# Patient Record
Sex: MALE | Race: WHITE | NOT HISPANIC OR LATINO | Employment: UNEMPLOYED | URBAN - NONMETROPOLITAN AREA
[De-identification: names, ages, dates, MRNs, and addresses within clinical notes are randomized per-mention and may not be internally consistent; named-entity substitution may affect disease eponyms.]

---

## 2017-01-13 VITALS
RESPIRATION RATE: 18 BRPM | DIASTOLIC BLOOD PRESSURE: 83 MMHG | BODY MASS INDEX: 27.2 KG/M2 | TEMPERATURE: 97.2 F | SYSTOLIC BLOOD PRESSURE: 125 MMHG | WEIGHT: 190 LBS | OXYGEN SATURATION: 95 % | HEIGHT: 70 IN | HEART RATE: 122 BPM

## 2017-01-14 ENCOUNTER — HOSPITAL ENCOUNTER (EMERGENCY)
Facility: HOSPITAL | Age: 57
Discharge: LEFT BEFORE DISCHARGE | End: 2017-01-14

## 2017-01-18 ENCOUNTER — HOSPITAL ENCOUNTER (INPATIENT)
Facility: HOSPITAL | Age: 57
LOS: 4 days | Discharge: HOME | End: 2017-01-22
Attending: EMERGENCY MEDICINE | Admitting: FAMILY MEDICINE

## 2017-01-18 ENCOUNTER — APPOINTMENT (OUTPATIENT)
Dept: GENERAL RADIOLOGY | Facility: HOSPITAL | Age: 57
End: 2017-01-18

## 2017-01-18 DIAGNOSIS — L03.116 CELLULITIS OF LEFT LOWER EXTREMITY: Primary | ICD-10-CM

## 2017-01-18 LAB
ALBUMIN SERPL-MCNC: 3.8 G/DL (ref 3.5–5)
ALBUMIN/GLOB SERPL: 0.9 G/DL (ref 1.5–2.5)
ALP SERPL-CCNC: 248 U/L (ref 46–116)
ALT SERPL W P-5'-P-CCNC: 24 U/L (ref 10–44)
ANION GAP SERPL CALCULATED.3IONS-SCNC: 10.2 MMOL/L (ref 3.6–11.2)
AST SERPL-CCNC: 37 U/L (ref 10–34)
BASOPHILS # BLD AUTO: 0.01 10*3/MM3 (ref 0–0.3)
BASOPHILS NFR BLD AUTO: 0.2 % (ref 0–2)
BILIRUB SERPL-MCNC: 0.3 MG/DL (ref 0.2–1.8)
BILIRUB UR QL STRIP: NEGATIVE
BUN BLD-MCNC: 6 MG/DL (ref 7–21)
BUN/CREAT SERPL: 8.5 (ref 7–25)
CALCIUM SPEC-SCNC: 8.7 MG/DL (ref 7.7–10)
CHLORIDE SERPL-SCNC: 102 MMOL/L (ref 99–112)
CLARITY UR: CLEAR
CO2 SERPL-SCNC: 32.8 MMOL/L (ref 24.3–31.9)
COLOR UR: YELLOW
CREAT BLD-MCNC: 0.71 MG/DL (ref 0.43–1.29)
CRP SERPL-MCNC: 0.91 MG/DL (ref 0–0.99)
D-LACTATE SERPL-SCNC: 2.7 MMOL/L (ref 0.5–2)
D-LACTATE SERPL-SCNC: 3 MMOL/L (ref 0.5–2)
DEPRECATED RDW RBC AUTO: 56.6 FL (ref 37–54)
EOSINOPHIL # BLD AUTO: 0.07 10*3/MM3 (ref 0–0.7)
EOSINOPHIL NFR BLD AUTO: 1.5 % (ref 0–5)
ERYTHROCYTE [DISTWIDTH] IN BLOOD BY AUTOMATED COUNT: 18 % (ref 11.5–14.5)
ERYTHROCYTE [SEDIMENTATION RATE] IN BLOOD: 29 MM/HR (ref 0–20)
GFR SERPL CREATININE-BSD FRML MDRD: 114 ML/MIN/1.73
GLOBULIN UR ELPH-MCNC: 4.4 GM/DL
GLUCOSE BLD-MCNC: 353 MG/DL (ref 70–110)
GLUCOSE BLDC GLUCOMTR-MCNC: 206 MG/DL (ref 70–130)
GLUCOSE BLDC GLUCOMTR-MCNC: 296 MG/DL (ref 70–130)
GLUCOSE UR STRIP-MCNC: ABNORMAL MG/DL
HCT VFR BLD AUTO: 41.7 % (ref 42–52)
HGB BLD-MCNC: 12.8 G/DL (ref 14–18)
HGB UR QL STRIP.AUTO: NEGATIVE
HOLD SPECIMEN: NORMAL
HOLD SPECIMEN: NORMAL
IMM GRANULOCYTES # BLD: 0 10*3/MM3 (ref 0–0.03)
IMM GRANULOCYTES NFR BLD: 0 % (ref 0–0.5)
KETONES UR QL STRIP: NEGATIVE
LEUKOCYTE ESTERASE UR QL STRIP.AUTO: NEGATIVE
LYMPHOCYTES # BLD AUTO: 1.63 10*3/MM3 (ref 1–3)
LYMPHOCYTES NFR BLD AUTO: 35.1 % (ref 21–51)
MCH RBC QN AUTO: 27.4 PG (ref 27–33)
MCHC RBC AUTO-ENTMCNC: 30.7 G/DL (ref 33–37)
MCV RBC AUTO: 89.1 FL (ref 80–94)
MONOCYTES # BLD AUTO: 0.42 10*3/MM3 (ref 0.1–0.9)
MONOCYTES NFR BLD AUTO: 9 % (ref 0–10)
NEUTROPHILS # BLD AUTO: 2.52 10*3/MM3 (ref 1.4–6.5)
NEUTROPHILS NFR BLD AUTO: 54.2 % (ref 30–70)
NITRITE UR QL STRIP: NEGATIVE
OSMOLALITY SERPL CALC.SUM OF ELEC: 300.5 MOSM/KG (ref 273–305)
PH UR STRIP.AUTO: 7 [PH] (ref 5–8)
PLATELET # BLD AUTO: 193 10*3/MM3 (ref 130–400)
PMV BLD AUTO: 9.8 FL (ref 6–10)
POTASSIUM BLD-SCNC: 3.5 MMOL/L (ref 3.5–5.3)
PROT SERPL-MCNC: 8.2 G/DL (ref 6–8)
PROT UR QL STRIP: NEGATIVE
RBC # BLD AUTO: 4.68 10*6/MM3 (ref 4.7–6.1)
SODIUM BLD-SCNC: 145 MMOL/L (ref 135–153)
SP GR UR STRIP: 1.02 (ref 1–1.03)
URATE SERPL-MCNC: 4 MG/DL (ref 3.7–7)
UROBILINOGEN UR QL STRIP: ABNORMAL
WBC NRBC COR # BLD: 4.65 10*3/MM3 (ref 4.5–12.5)
WHOLE BLOOD HOLD SPECIMEN: NORMAL
WHOLE BLOOD HOLD SPECIMEN: NORMAL

## 2017-01-18 PROCEDURE — 25010000002 ONDANSETRON PER 1 MG

## 2017-01-18 PROCEDURE — 85652 RBC SED RATE AUTOMATED: CPT | Performed by: PHYSICIAN ASSISTANT

## 2017-01-18 PROCEDURE — 86140 C-REACTIVE PROTEIN: CPT | Performed by: PHYSICIAN ASSISTANT

## 2017-01-18 PROCEDURE — 25010000002 MORPHINE SULFATE (PF) 2 MG/ML SOLUTION: Performed by: FAMILY MEDICINE

## 2017-01-18 PROCEDURE — 73610 X-RAY EXAM OF ANKLE: CPT | Performed by: RADIOLOGY

## 2017-01-18 PROCEDURE — 83605 ASSAY OF LACTIC ACID: CPT | Performed by: EMERGENCY MEDICINE

## 2017-01-18 PROCEDURE — 73630 X-RAY EXAM OF FOOT: CPT

## 2017-01-18 PROCEDURE — 25010000002 PIPERACILLIN-TAZOBACTAM: Performed by: PHYSICIAN ASSISTANT

## 2017-01-18 PROCEDURE — 96361 HYDRATE IV INFUSION ADD-ON: CPT

## 2017-01-18 PROCEDURE — 25010000002 VANCOMYCIN PER 500 MG

## 2017-01-18 PROCEDURE — 36415 COLL VENOUS BLD VENIPUNCTURE: CPT

## 2017-01-18 PROCEDURE — 84550 ASSAY OF BLOOD/URIC ACID: CPT | Performed by: PHYSICIAN ASSISTANT

## 2017-01-18 PROCEDURE — G0378 HOSPITAL OBSERVATION PER HR: HCPCS

## 2017-01-18 PROCEDURE — 25010000002 MORPHINE PER 10 MG: Performed by: EMERGENCY MEDICINE

## 2017-01-18 PROCEDURE — 85025 COMPLETE CBC W/AUTO DIFF WBC: CPT | Performed by: PHYSICIAN ASSISTANT

## 2017-01-18 PROCEDURE — 63710000001 INSULIN ASPART PER 5 UNITS: Performed by: FAMILY MEDICINE

## 2017-01-18 PROCEDURE — 81003 URINALYSIS AUTO W/O SCOPE: CPT | Performed by: PHYSICIAN ASSISTANT

## 2017-01-18 PROCEDURE — 25010000002 PIPERACILLIN-TAZOBACTAM: Performed by: FAMILY MEDICINE

## 2017-01-18 PROCEDURE — 82962 GLUCOSE BLOOD TEST: CPT

## 2017-01-18 PROCEDURE — 25010000002 VANCOMYCIN PER 500 MG: Performed by: PHYSICIAN ASSISTANT

## 2017-01-18 PROCEDURE — 80053 COMPREHEN METABOLIC PANEL: CPT | Performed by: PHYSICIAN ASSISTANT

## 2017-01-18 PROCEDURE — 25010000002 HYDROMORPHONE PER 4 MG

## 2017-01-18 PROCEDURE — 96375 TX/PRO/DX INJ NEW DRUG ADDON: CPT

## 2017-01-18 PROCEDURE — 73610 X-RAY EXAM OF ANKLE: CPT

## 2017-01-18 PROCEDURE — 99284 EMERGENCY DEPT VISIT MOD MDM: CPT

## 2017-01-18 PROCEDURE — 73630 X-RAY EXAM OF FOOT: CPT | Performed by: RADIOLOGY

## 2017-01-18 PROCEDURE — 96374 THER/PROPH/DIAG INJ IV PUSH: CPT

## 2017-01-18 PROCEDURE — 87040 BLOOD CULTURE FOR BACTERIA: CPT | Performed by: EMERGENCY MEDICINE

## 2017-01-18 RX ORDER — ONDANSETRON 2 MG/ML
INJECTION INTRAMUSCULAR; INTRAVENOUS
Status: COMPLETED
Start: 2017-01-18 | End: 2017-01-18

## 2017-01-18 RX ORDER — MORPHINE SULFATE 2 MG/ML
2 INJECTION, SOLUTION INTRAMUSCULAR; INTRAVENOUS
Status: DISCONTINUED | OUTPATIENT
Start: 2017-01-18 | End: 2017-01-22 | Stop reason: HOSPADM

## 2017-01-18 RX ORDER — TRAMADOL HYDROCHLORIDE 50 MG/1
50 TABLET ORAL 3 TIMES DAILY PRN
Status: CANCELLED | OUTPATIENT
Start: 2017-01-18

## 2017-01-18 RX ORDER — CITALOPRAM 20 MG/1
20 TABLET ORAL DAILY
Status: CANCELLED | OUTPATIENT
Start: 2017-01-19

## 2017-01-18 RX ORDER — POTASSIUM CHLORIDE 750 MG/1
10 CAPSULE, EXTENDED RELEASE ORAL DAILY
Status: CANCELLED | OUTPATIENT
Start: 2017-01-19

## 2017-01-18 RX ORDER — SPIRONOLACTONE 25 MG/1
25 TABLET ORAL DAILY
Status: CANCELLED | OUTPATIENT
Start: 2017-01-19

## 2017-01-18 RX ORDER — TRAMADOL HYDROCHLORIDE 50 MG/1
50 TABLET ORAL 3 TIMES DAILY PRN
COMMUNITY

## 2017-01-18 RX ORDER — SODIUM CHLORIDE 9 MG/ML
INJECTION, SOLUTION INTRAVENOUS
Status: COMPLETED
Start: 2017-01-18 | End: 2017-01-18

## 2017-01-18 RX ORDER — ONDANSETRON 2 MG/ML
4 INJECTION INTRAMUSCULAR; INTRAVENOUS ONCE
Status: COMPLETED | OUTPATIENT
Start: 2017-01-18 | End: 2017-01-18

## 2017-01-18 RX ORDER — NICOTINE POLACRILEX 4 MG
15 LOZENGE BUCCAL
Status: DISCONTINUED | OUTPATIENT
Start: 2017-01-18 | End: 2017-01-22 | Stop reason: HOSPADM

## 2017-01-18 RX ORDER — ALPRAZOLAM 1 MG/1
1 TABLET ORAL 2 TIMES DAILY PRN
Status: DISCONTINUED | OUTPATIENT
Start: 2017-01-18 | End: 2017-01-22 | Stop reason: HOSPADM

## 2017-01-18 RX ORDER — PANTOPRAZOLE SODIUM 40 MG/1
40 TABLET, DELAYED RELEASE ORAL 2 TIMES DAILY
Status: CANCELLED | OUTPATIENT
Start: 2017-01-18

## 2017-01-18 RX ORDER — DULOXETIN HYDROCHLORIDE 60 MG/1
60 CAPSULE, DELAYED RELEASE ORAL DAILY
Status: CANCELLED | OUTPATIENT
Start: 2017-01-19

## 2017-01-18 RX ORDER — GLIPIZIDE 5 MG/1
5 TABLET ORAL
Status: DISCONTINUED | OUTPATIENT
Start: 2017-01-18 | End: 2017-01-22 | Stop reason: HOSPADM

## 2017-01-18 RX ORDER — PANTOPRAZOLE SODIUM 40 MG/1
40 TABLET, DELAYED RELEASE ORAL
Status: DISCONTINUED | OUTPATIENT
Start: 2017-01-18 | End: 2017-01-22 | Stop reason: HOSPADM

## 2017-01-18 RX ORDER — POTASSIUM CHLORIDE 750 MG/1
10 TABLET, FILM COATED, EXTENDED RELEASE ORAL DAILY
Status: DISCONTINUED | OUTPATIENT
Start: 2017-01-19 | End: 2017-01-22 | Stop reason: HOSPADM

## 2017-01-18 RX ORDER — ALPRAZOLAM 1 MG/1
1 TABLET ORAL 2 TIMES DAILY PRN
COMMUNITY

## 2017-01-18 RX ORDER — DEXTROSE MONOHYDRATE 25 G/50ML
25 INJECTION, SOLUTION INTRAVENOUS
Status: DISCONTINUED | OUTPATIENT
Start: 2017-01-18 | End: 2017-01-22 | Stop reason: HOSPADM

## 2017-01-18 RX ORDER — GABAPENTIN 400 MG/1
800 CAPSULE ORAL EVERY 6 HOURS SCHEDULED
Status: DISCONTINUED | OUTPATIENT
Start: 2017-01-18 | End: 2017-01-22 | Stop reason: HOSPADM

## 2017-01-18 RX ORDER — SPIRONOLACTONE 25 MG/1
25 TABLET ORAL DAILY
Status: DISCONTINUED | OUTPATIENT
Start: 2017-01-19 | End: 2017-01-22 | Stop reason: HOSPADM

## 2017-01-18 RX ORDER — DULOXETIN HYDROCHLORIDE 60 MG/1
60 CAPSULE, DELAYED RELEASE ORAL DAILY
Status: DISCONTINUED | OUTPATIENT
Start: 2017-01-19 | End: 2017-01-22 | Stop reason: HOSPADM

## 2017-01-18 RX ORDER — ALPRAZOLAM 0.5 MG/1
1 TABLET ORAL 2 TIMES DAILY PRN
Status: CANCELLED | OUTPATIENT
Start: 2017-01-18

## 2017-01-18 RX ORDER — GABAPENTIN 400 MG/1
800 CAPSULE ORAL 4 TIMES DAILY PRN
Status: CANCELLED | OUTPATIENT
Start: 2017-01-18

## 2017-01-18 RX ORDER — POTASSIUM CHLORIDE 750 MG/1
10 CAPSULE, EXTENDED RELEASE ORAL DAILY
Status: DISCONTINUED | OUTPATIENT
Start: 2017-01-19 | End: 2017-01-18 | Stop reason: SDUPTHER

## 2017-01-18 RX ORDER — GLIPIZIDE 5 MG/1
5 TABLET ORAL
Status: CANCELLED | OUTPATIENT
Start: 2017-01-18

## 2017-01-18 RX ORDER — CITALOPRAM 20 MG/1
20 TABLET ORAL DAILY
Status: DISCONTINUED | OUTPATIENT
Start: 2017-01-19 | End: 2017-01-22 | Stop reason: HOSPADM

## 2017-01-18 RX ADMIN — SODIUM CHLORIDE 1000 ML: 9 INJECTION, SOLUTION INTRAVENOUS at 09:50

## 2017-01-18 RX ADMIN — HYDROMORPHONE HYDROCHLORIDE 1 MG: 1 INJECTION, SOLUTION INTRAMUSCULAR; INTRAVENOUS; SUBCUTANEOUS at 11:21

## 2017-01-18 RX ADMIN — ONDANSETRON 4 MG: 2 INJECTION INTRAMUSCULAR; INTRAVENOUS at 10:40

## 2017-01-18 RX ADMIN — Medication 1 MG: at 11:21

## 2017-01-18 RX ADMIN — GLIPIZIDE 5 MG: 5 TABLET ORAL at 18:23

## 2017-01-18 RX ADMIN — INSULIN ASPART 4 UNITS: 100 INJECTION, SOLUTION INTRAVENOUS; SUBCUTANEOUS at 20:14

## 2017-01-18 RX ADMIN — SODIUM CHLORIDE 1500 ML: 9 INJECTION, SOLUTION INTRAVENOUS at 10:39

## 2017-01-18 RX ADMIN — VANCOMYCIN HYDROCHLORIDE 1500 MG: 5 INJECTION, POWDER, LYOPHILIZED, FOR SOLUTION INTRAVENOUS at 20:06

## 2017-01-18 RX ADMIN — GABAPENTIN 800 MG: 400 CAPSULE ORAL at 18:23

## 2017-01-18 RX ADMIN — MORPHINE SULFATE 2 MG: 2 INJECTION, SOLUTION INTRAMUSCULAR; INTRAVENOUS at 20:14

## 2017-01-18 RX ADMIN — ONDANSETRON 4 MG: 2 INJECTION, SOLUTION INTRAMUSCULAR; INTRAVENOUS at 10:40

## 2017-01-18 RX ADMIN — PIPERACILLIN SODIUM,TAZOBACTAM SODIUM 3.38 G: 3; .375 INJECTION, POWDER, FOR SOLUTION INTRAVENOUS at 23:18

## 2017-01-18 RX ADMIN — MORPHINE SULFATE 2 MG: 2 INJECTION, SOLUTION INTRAMUSCULAR; INTRAVENOUS at 17:22

## 2017-01-18 RX ADMIN — PIPERACILLIN SODIUM,TAZOBACTAM SODIUM 3.38 G: 3; .375 INJECTION, POWDER, FOR SOLUTION INTRAVENOUS at 17:22

## 2017-01-18 RX ADMIN — MORPHINE SULFATE 4 MG: 4 INJECTION, SOLUTION INTRAMUSCULAR; INTRAVENOUS at 10:42

## 2017-01-18 RX ADMIN — MORPHINE SULFATE 2 MG: 2 INJECTION, SOLUTION INTRAMUSCULAR; INTRAVENOUS at 23:04

## 2017-01-18 RX ADMIN — PANTOPRAZOLE SODIUM 40 MG: 40 TABLET, DELAYED RELEASE ORAL at 18:23

## 2017-01-18 RX ADMIN — GABAPENTIN 800 MG: 400 CAPSULE ORAL at 23:18

## 2017-01-18 RX ADMIN — INSULIN ASPART 4 UNITS: 100 INJECTION, SOLUTION INTRAVENOUS; SUBCUTANEOUS at 17:22

## 2017-01-18 NOTE — CONSULTS
Pharmacy was consulted for vancomycin dosing for cellulitis LLE.  Based on pt parameters will initiate vancomycin at 1500mg iv q12hrs to target trough levels of 15-20 mg/L.  Pharmacy will continue to follow and obtain trough level once steady state is achieved.  Thank you.

## 2017-01-18 NOTE — ED NOTES
Pt resting quietly on stretcher with complaint of pain.  Pt AAOx4 with no resp distress noted.  Pt denies any needs at this time.  Skin PWD.  Pt family at bedside. Will continue to monitor and follow plan of care.     Malorie Friedman RN  01/18/17 1000

## 2017-01-18 NOTE — ED NOTES
Pt reports that he has been having pain in his left foot x 1 week.  Reports that he noticed a bump to the area, sterilized a razor blade and cut it open.  Redness, tenderness, erythema, warmth noted to area.  Pt reports that he is having severe pain to his left foot and feels like it is in his bone.  Pt family at bedside.     Malorie Friedman RN  01/18/17 3462

## 2017-01-18 NOTE — ED NOTES
Pt resting quietly on stretcher with complaint of pain.  Pt AAOx4 with no resp distress noted.  Pt denies any needs at this time.  Skin PWD.  Pt family at bedside. Will continue to monitor and follow plan of care.     Malorie Friedman RN  01/18/17 8009

## 2017-01-18 NOTE — ED NOTES
Pt resting quietly on stretcher with continuing complaint of pain.  Pt AAOx4 with no resp distress noted.  Pt denies any needs at this time.  Skin PWD.  Pt family at bedside. Will continue to monitor and follow plan of care.     Malorie Friedman RN  01/18/17 9981

## 2017-01-18 NOTE — ED PROVIDER NOTES
Subjective   Patient is a 56 y.o. male presenting with lower extremity pain.   History provided by:  Patient   used: No    Lower Extremity Issue   Location:  Ankle and foot  Time since incident:  1 week  Injury: no    Ankle location:  L ankle  Foot location:  L foot  Pain details:     Severity:  Moderate    Duration:  2 weeks    Timing:  Constant    Progression:  Worsening  Chronicity:  New  Dislocation: no    Foreign body present:  No foreign bodies  Prior injury to area:  No  Worsened by:  Nothing  Ineffective treatments:  None tried  Associated symptoms: stiffness and swelling    Associated symptoms: no back pain and no decreased ROM    Risk factors: no concern for non-accidental trauma, no frequent fractures and no obesity        Review of Systems   Musculoskeletal: Positive for stiffness. Negative for back pain.   Skin: Positive for rash and wound.   All other systems reviewed and are negative.      Past Medical History   Diagnosis Date   • Alcohol abuse    • Anxiety    • Depression    • Diabetes    • GERD (gastroesophageal reflux disease)    • Hepatic encephalopathy    • Hypertension    • Liver disease    • Neuropathic pain    • Withdrawal symptoms, alcohol        Allergies   Allergen Reactions   • Sulfa Antibiotics        Past Surgical History   Procedure Laterality Date   • Knee surgery Right        Family History   Problem Relation Age of Onset   • Family history unknown: Yes       Social History     Social History   • Marital status:      Spouse name: N/A   • Number of children: N/A   • Years of education: N/A     Social History Main Topics   • Smoking status: Current Every Day Smoker     Packs/day: 1.00     Years: 40.00     Types: Cigarettes   • Smokeless tobacco: Never Used      Comment: declines   • Alcohol use Yes      Comment: patient says he started drinking at age 11. drank heavy for several years starting about 45 years ago. has been drinking more which is now  up to a   5th a day this time for about 7 months.    • Drug use: No      Comment: denies   • Sexual activity: Yes     Partners: Female     Birth control/ protection: None     Other Topics Concern   • None     Social History Narrative           Objective   Physical Exam   Constitutional: He is oriented to person, place, and time. He appears well-developed and well-nourished.   HENT:   Head: Normocephalic.   Right Ear: External ear normal.   Left Ear: External ear normal.   Nose: Nose normal.   Mouth/Throat: Oropharynx is clear and moist.   Eyes: Conjunctivae and EOM are normal. Pupils are equal, round, and reactive to light.   Neck: Normal range of motion. Neck supple. No thyromegaly present.   Cardiovascular: Normal rate, regular rhythm, normal heart sounds and intact distal pulses.    Pulmonary/Chest: Effort normal and breath sounds normal.   Abdominal: Soft. Bowel sounds are normal.   Lymphadenopathy:     He has no cervical adenopathy.   Neurological: He is alert and oriented to person, place, and time. He has normal reflexes.   Skin: Skin is warm and dry. Bruising, ecchymosis and rash noted.        Small blisters to left lateral malleolus.  Approximately 2 cm scaling plaque.  Moderate erythematous rash extending from left foot approximately to mid calf.   Psychiatric: He has a normal mood and affect. His behavior is normal. Judgment and thought content normal.   Nursing note and vitals reviewed.      Procedures         ED Course  ED Course   Comment By Time   This is a 56-year-old male comes in with chief complaint left lower extremity pain, swelling, redness.  Patient does report that approximately one week ago he noticed some small blisters on the lower part of his leg that he tried to pop.  He states that approximately 2 days after this he developed apparent secondary infection.  Patient denies any fever, chills.  Patient is a known diabetic. Berry Lambert PA-C 01/18 1759   Discuss care with Dr. Sanon regarding  patient's left lower extremity cellulitis.  Agrees that patient should be admitted with IV antibiotics.  Berry Lambert PA-C 01/18 1236                  MDM  Number of Diagnoses or Management Options  Cellulitis of left lower extremity: new and requires workup     Amount and/or Complexity of Data Reviewed  Clinical lab tests: ordered and reviewed    Risk of Complications, Morbidity, and/or Mortality  Presenting problems: moderate  Diagnostic procedures: moderate  Management options: moderate    Patient Progress  Patient progress: stable      Final diagnoses:   Cellulitis of left lower extremity            Berry Lambert PA-C  01/18/17 1241

## 2017-01-18 NOTE — PLAN OF CARE
Problem: Patient Care Overview (Adult)  Goal: Plan of Care Review  Outcome: Ongoing (interventions implemented as appropriate)    Problem: Fall Risk (Adult)  Goal: Identify Related Risk Factors and Signs and Symptoms  Outcome: Ongoing (interventions implemented as appropriate)    Problem: Infection, Risk/Actual (Adult)  Goal: Identify Related Risk Factors and Signs and Symptoms  Outcome: Ongoing (interventions implemented as appropriate)  Goal: Infection Prevention/Resolution  Outcome: Ongoing (interventions implemented as appropriate)    Problem: Cellulitis (Adult)  Goal: Signs and Symptoms of Listed Potential Problems Will be Absent or Manageable (Cellulitis)  Outcome: Ongoing (interventions implemented as appropriate)

## 2017-01-18 NOTE — IP AVS SNAPSHOT
CHELSY PARKER   Facility: [facility] ([place])   SA: [facility]    MRN:  [id number]   PT#:     Report:  [id number] - Summary of Care Document           Basic Information     Date Of Birth Sex Race Ethnicity Preferred Language Preferred Written Language    1960 Male White or  Not  or  English English      Allergies as of 1/22/2017  Reviewed On: 1/19/2017 By: Buck Crook RN       Noted Reaction Type Reactions    Sulfa Antibiotics 09/23/2016          Current Medications Are     ALPRAZolam (XANAX) 1 MG tablet Take 1 mg by mouth 2 (Two) Times a Day As Needed for anxiety.    amoxicillin-clavulanate (AUGMENTIN) 875-125 MG per tablet Take 1 tablet by mouth 2 (Two) Times a Day.    citalopram (CeleXA) 20 MG tablet Take 20 mg by mouth daily.    doxycycline (DORYX) 100 MG enteric coated tablet Take 1 tablet by mouth 2 (Two) Times a Day.    DULoxetine (CYMBALTA) 60 MG capsule Take 60 mg by mouth daily.    gabapentin (NEURONTIN) 800 MG tablet Take 800 mg by mouth 4 (four) times a day.    glipiZIDE (GLUCOTROL) 5 MG tablet Take 5 mg by mouth 2 (two) times a day before meals.    pantoprazole (PROTONIX) 40 MG EC tablet Take 40 mg by mouth 2 (two) times a day.    potassium chloride (MICRO-K) 10 MEQ CR capsule Take 10 mEq by mouth daily.    spironolactone (ALDACTONE) 50 MG tablet Take 25 mg by mouth Daily. Take 1/2 tablet (25mg) daily    traMADol (ULTRAM) 50 MG tablet Take 50 mg by mouth 3 (Three) Times a Day As Needed for moderate pain (4-6).      Current Immunizations     No immunizations on file.      Problem List as of 1/22/2017  Date Reviewed: 1/20/2017             Codes Priority Class Noted - Resolved    Alcohol dependence with withdrawal ICD-10-CM: F10.239  ICD-9-CM: 303.90, 291.81   9/24/2016 - Present    Dependence on nicotine from cigarettes ICD-10-CM: F17.210  ICD-9-CM: 305.1   9/24/2016 - Present    Hypertensive vascular disease ICD-10-CM: I10  ICD-9-CM: 401.9    9/24/2016 - Present    Alcoholic hepatitis ICD-10-CM: K70.10  ICD-9-CM: 571.1   9/24/2016 - Present    GERD (gastroesophageal reflux disease) ICD-10-CM: K21.9  ICD-9-CM: 530.81   9/24/2016 - Present    Acute radial nerve palsy of right upper extremity ICD-10-CM: G56.31  ICD-9-CM: 354.3   9/25/2016 - Present    MDD (major depressive disorder), recurrent severe, without psychosis ICD-10-CM: F33.2  ICD-9-CM: 296.33   9/29/2016 - Present    Cellulitis of left lower extremity ICD-10-CM: L03.116  ICD-9-CM: 682.6   1/18/2017 - Present      Diagnoses        Codes Comments    Cellulitis of left lower extremity    -  Primary ICD-10-CM: L03.116  ICD-9-CM: 682.6       Lab and Imaging Results     Procedure Component Value Units Date/Time    Blood Culture [[id number]]  (Normal) Collected:  01/18/17 1000    Specimen:  Blood from Arm, Right Updated:  01/22/17 1101     Blood Culture No growth at 4 days     Blood Culture [[id number]]  (Normal) Collected:  01/18/17 1038    Specimen:  Blood from Arm, Left Updated:  01/22/17 1101     Blood Culture No growth at 4 days     Vancomycin, Trough [[id number]]  (Abnormal) Collected:  01/22/17 0810    Specimen:  Blood Updated:  01/22/17 0858     Vancomycin Trough 17.00 (H) mcg/mL     POC Glucose Fingerstick [[id number]]  (Abnormal) Collected:  01/22/17 0717    Specimen:  Blood Updated:  01/22/17 0720     Glucose 262 (H) mg/dL     Narrative:       Meter: [id number] : [id number] ADDISON DWYER    Basic Metabolic Panel [[id number]]  (Abnormal) Collected:  01/22/17 0157    Specimen:  Blood Updated:  01/22/17 0329     Glucose 256 (H) mg/dL      BUN <5 (L) mg/dL      Creatinine 0.78 mg/dL      Sodium 144 mmol/L      Potassium 3.7 mmol/L      Chloride 108 mmol/L      CO2 25.3 mmol/L      Calcium 7.9 mg/dL      eGFR Non African Amer 103 mL/min/1.73      BUN/Creatinine Ratio --       Unable to calculate Bun/Crea Ratio.        Anion Gap 10.7 mmol/L     Narrative:       GFR Normal >60  Chronic Kidney Disease  <60  Kidney Failure <15    Osmolality, Calculated [[id number]] Collected:  01/22/17 0157    Specimen:  Blood Updated:  01/22/17 0329     Osmolality Calc -- mOsm/kg       Unable to calculate.       CBC & Differential [[id number]] Collected:  01/22/17 0157    Specimen:  Blood Updated:  01/22/17 0306    Narrative:       The following orders were created for panel order CBC & Differential.  Procedure                               Abnormality         Status                     ---------                               -----------         ------                     CBC Auto Differential[[id number]]         Abnormal            Final result                 Please view results for these tests on the individual orders.    CBC Auto Differential [[id number]]  (Abnormal) Collected:  01/22/17 0157    Specimen:  Blood Updated:  01/22/17 0306     WBC 4.55 10*3/mm3      RBC 4.38 (L) 10*6/mm3      Hemoglobin 11.7 (L) g/dL      Hematocrit 39.0 (L) %      MCV 89.0 fL      MCH 26.7 (L) pg      MCHC 30.0 (L) g/dL      RDW 17.3 (H) %      RDW-SD 55.2 (H) fl      MPV 9.9 fL      Platelets 214 10*3/mm3      Neutrophil % 49.7 %      Lymphocyte % 39.3 %      Monocyte % 8.4 %      Eosinophil % 2.0 %      Basophil % 0.2 %      Immature Grans % 0.4 %      Neutrophils, Absolute 2.26 10*3/mm3      Lymphocytes, Absolute 1.79 10*3/mm3      Monocytes, Absolute 0.38 10*3/mm3      Eosinophils, Absolute 0.09 10*3/mm3      Basophils, Absolute 0.01 10*3/mm3      Immature Grans, Absolute 0.02 10*3/mm3     POC Glucose Fingerstick [[id number]]  (Abnormal) Collected:  01/21/17 1946    Specimen:  Blood Updated:  01/21/17 1950     Glucose 265 (H) mg/dL     Narrative:       Meter: [id number] : [id number] enedelia tidwell    POC Glucose Fingerstick [[id number]]  (Abnormal) Collected:  01/21/17 1657    Specimen:  Blood Updated:  01/21/17 1701     Glucose 212 (H) mg/dL     Narrative:       Meter: [id number] : [id number] HEMALATHA DENG    POC Glucose Fingerstick [[id number]]   (Abnormal) Collected:  01/21/17 1157    Specimen:  Blood Updated:  01/21/17 1201     Glucose 317 (H) mg/dL     Narrative:       Meter: [id number] : [id number] PENNY ISH    POC Glucose Fingerstick [[id number]]  (Abnormal) Collected:  01/21/17 0701    Specimen:  Blood Updated:  01/21/17 0705     Glucose 166 (H) mg/dL     Narrative:       Meter: [id number] : [id number] PENNY ISH    POC Glucose Fingerstick [[id number]]  (Abnormal) Collected:  01/20/17 2110    Specimen:  Blood Updated:  01/20/17 2113     Glucose 280 (H) mg/dL     Narrative:       Meter: [id number] : [id number] yomaira leone    POC Glucose Fingerstick [[id number]]  (Abnormal) Collected:  01/20/17 1655    Specimen:  Blood Updated:  01/20/17 1658     Glucose 235 (H) mg/dL     Narrative:       Meter: [id number] : [id number] PENNY ISH    POC Glucose Fingerstick [[id number]]  (Abnormal) Collected:  01/20/17 1147    Specimen:  Blood Updated:  01/20/17 1151     Glucose 245 (H) mg/dL     Narrative:       Meter: [id number] : [id number] PENNY ISH    Vancomycin, Trough [[id number]]  (Normal) Collected:  01/20/17 0737    Specimen:  Blood Updated:  01/20/17 0821     Vancomycin Trough 12.80 mcg/mL     POC Glucose Fingerstick [[id number]]  (Abnormal) Collected:  01/20/17 0725    Specimen:  Blood Updated:  01/20/17 0728     Glucose 142 (H) mg/dL     Narrative:       Meter: [id number] : [id number] PENNY ISH    POC Glucose Fingerstick [[id number]]  (Abnormal) Collected:  01/19/17 2052    Specimen:  Blood Updated:  01/19/17 2055     Glucose 219 (H) mg/dL     Narrative:       Meter: [id number] : [id number] flip apodaca    POC Glucose Fingerstick [[id number]]  (Abnormal) Collected:  01/19/17 1714    Specimen:  Blood Updated:  01/19/17 1726     Glucose 250 (H) mg/dL     Narrative:       Meter: [id number] : [id number] parth adams    POC Glucose Fingerstick [[id number]]  (Abnormal) Collected:  01/19/17 1308    Specimen:  Blood Updated:   01/19/17 1312     Glucose 134 (H) mg/dL     Narrative:       Meter: [id number] : [id number] Alyssia MORALES    POC Glucose Fingerstick [[id number]]  (Normal) Collected:  01/19/17 1126    Specimen:  Blood Updated:  01/19/17 1138     Glucose 122 mg/dL     Narrative:       Meter: [id number] : [id number] parth adams    POC Glucose Fingerstick [[id number]]  (Abnormal) Collected:  01/18/17 2006    Specimen:  Blood Updated:  01/18/17 2010     Glucose 296 (H) mg/dL     Narrative:       Meter: [id number] : [id number] flip apodaca    POC Glucose Fingerstick [[id number]]  (Abnormal) Collected:  01/18/17 1654    Specimen:  Blood Updated:  01/18/17 1658     Glucose 206 (H) mg/dL     Narrative:       Meter: [id number] : [id number] mayne mary    Lactate Acid, Reflex [[id number]]  (Abnormal) Collected:  01/18/17 1408    Specimen:  Blood Updated:  01/18/17 1510     Lactate 2.7 (C) mmol/L     [place] Draw [[id number]] Collected:  01/18/17 1000    Specimen:  Blood Updated:  01/18/17 1401    Narrative:       The following orders were created for panel order [place] Draw.  Procedure                               Abnormality         Status                     ---------                               -----------         ------                     Light Blue Top[[id number]]                                    Final result               Green Top (Gel)[[id number]]                                   Final result               Lavender Top[[id number]]                                      Final result               Gold Top - SST[[id number]]                                    Final result                 Please view results for these tests on the individual orders.    Green Top (Gel) [[id number]] Collected:  01/18/17 1000    Specimen:  Blood Updated:  01/18/17 1401     Extra Tube Hold for add-ons.       Auto resulted.       Gold Top - SST [[id number]] Collected:  01/18/17 1000    Specimen:  Blood Updated:  01/18/17 1401     Extra Tube Hold for  add-ons.       Auto resulted.       Light Blue Top [[id number]] Collected:  01/18/17 1000    Specimen:  Blood Updated:  01/18/17 1401     Extra Tube hold for add-on       Auto resulted       Lavender Top [[id number]] Collected:  01/18/17 1000    Specimen:  Blood Updated:  01/18/17 1401     Extra Tube hold for add-on       Auto resulted       Urinalysis With / Culture If Indicated [[id number]]  (Abnormal) Collected:  01/18/17 1141    Specimen:  Urine from Urine, Clean Catch Updated:  01/18/17 1147     Color, UA Yellow      Appearance, UA Clear      pH, UA 7.0      Specific Gravity, UA 1.016      Glucose, UA >=1000 mg/dL (3+) (A)      Ketones, UA Negative      Bilirubin, UA Negative      Blood, UA Negative      Protein, UA Negative      Leuk Esterase, UA Negative      Nitrite, UA Negative      Urobilinogen, UA 1.0 E.U./dL     Narrative:       Urine microscopic not indicated.    XR Ankle 3+ View Left [[id number]] Collected:  01/18/17 1105     Updated:  01/18/17 1108    Narrative:       XR ANKLE 3+ VIEW LEFT-     REASON FOR EXAM:  Pain/swelling.     FINDINGS: The bony structures of the ankle show no evidence of fracture  or dislocation. There is evidence of diffuse soft tissue swelling. There  were no foreign bodies in the soft tissues.       Impression:       Soft tissue swelling around the left ankle.     This report was finalized on 1/18/2017 11:06 AM by Dr. Erik Dunn II, MD.       XR Foot 3+ View Left [[id number]] Collected:  01/18/17 1057     Updated:  01/18/17 1100    Narrative:       XR FOOT 3+ VW LEFT-     REASON FOR EXAM:  pain/swelling     The bony structures are intact. There were no acute fractures,  dislocations or bony destructive changes seen. No periosteal reaction is  noted. There were no radiopaque foreign bodies identified in the soft  tissues.       Impression:       Negative foot     This report was finalized on 1/18/2017 10:58 AM by Dr. Erik Dunn II, MD.       Sedimentation Rate [[id number]]   (Abnormal) Collected:  01/18/17 1000    Specimen:  Blood Updated:  01/18/17 1028     Sed Rate 29 (H) mm/hr     Comprehensive Metabolic Panel [[id number]]  (Abnormal) Collected:  01/18/17 1000    Specimen:  Blood Updated:  01/18/17 1028     Glucose 353 (H) mg/dL      BUN 6 (L) mg/dL      Creatinine 0.71 mg/dL      Sodium 145 mmol/L      Potassium 3.5 mmol/L      Chloride 102 mmol/L      CO2 32.8 (H) mmol/L      Calcium 8.7 mg/dL      Total Protein 8.2 (H) g/dL      Albumin 3.80 g/dL      ALT (SGPT) 24 U/L      AST (SGOT) 37 (H) U/L      Alkaline Phosphatase 248 (H) U/L      Total Bilirubin 0.3 mg/dL      eGFR Non African Amer 114 mL/min/1.73      Globulin 4.4 gm/dL      A/G Ratio 0.9 (L) g/dL      BUN/Creatinine Ratio 8.5      Anion Gap 10.2 mmol/L     Osmolality, Calculated [[id number]]  (Normal) Collected:  01/18/17 1000    Specimen:  Blood Updated:  01/18/17 1028     Osmolality Calc 300.5 mOsm/kg     Uric Acid [[id number]]  (Normal) Collected:  01/18/17 1000    Specimen:  Blood Updated:  01/18/17 1027     Uric Acid 4.0 mg/dL     C-reactive Protein [[id number]]  (Normal) Collected:  01/18/17 1000    Specimen:  Blood Updated:  01/18/17 1026     C-Reactive Protein 0.91 mg/dL     Lactic Acid, Plasma [[id number]]  (Abnormal) Collected:  01/18/17 1000    Specimen:  Blood Updated:  01/18/17 1025     Lactate 3.0 (C) mmol/L     CBC & Differential [[id number]] Collected:  01/18/17 1000    Specimen:  Blood Updated:  01/18/17 1010    Narrative:       The following orders were created for panel order CBC & Differential.  Procedure                               Abnormality         Status                     ---------                               -----------         ------                     CBC Auto Differential[[id number]]         Abnormal            Final result                 Please view results for these tests on the individual orders.    CBC Auto Differential [[id number]]  (Abnormal) Collected:  01/18/17 1000    Specimen:  Blood  "Updated:  01/18/17 1010     WBC 4.65 10*3/mm3      RBC 4.68 (L) 10*6/mm3      Hemoglobin 12.8 (L) g/dL      Hematocrit 41.7 (L) %      MCV 89.1 fL      MCH 27.4 pg      MCHC 30.7 (L) g/dL      RDW 18.0 (H) %      RDW-SD 56.6 (H) fl      MPV 9.8 fL      Platelets 193 10*3/mm3      Neutrophil % 54.2 %      Lymphocyte % 35.1 %      Monocyte % 9.0 %      Eosinophil % 1.5 %      Basophil % 0.2 %      Immature Grans % 0.0 %      Neutrophils, Absolute 2.52 10*3/mm3      Lymphocytes, Absolute 1.63 10*3/mm3      Monocytes, Absolute 0.42 10*3/mm3      Eosinophils, Absolute 0.07 10*3/mm3      Basophils, Absolute 0.01 10*3/mm3      Immature Grans, Absolute 0.00 10*3/mm3       Vitals     BP Pulse Temp Resp Ht Wt    115/83 (BP Location: Right arm, Patient Position: Lying) 112 98 °F (36.7 °C) (Oral) 22 70\" (177.8 cm) 190 lb (86.2 kg)    SpO2 BMI             98% 27.26 kg/m2       Vitals History      Social History     Category History    Smoking Tobacco Use Current Every Day Smoker; Start date: ; 1 pack/day for 40 years (40 pk yrs); Types: Cigarettes    Smokeless Tobacco Use Never Used    Tobacco Comment declines    Alcohol Use Yes; (patient says he started drinking at age 11. drank heavy for several years starting about 45 years ago. has been drinking more which is now  up to a  5th a day this time for about 7 months. )    Drug Use No; (denies)    Sexual Activity Yes;  Female partners; Birth Ctrl/Protection: None    ADL Not Asked      Patient Care Team        Relationship Specialty Notifications Start End    Edgar Sanon MD PCP - General   1/26/16     Edgar Sanon MD PCP - Family Medicine   1/26/16        Instructions for After Discharge        Follow-up Information     Follow up with Edgar Sanon MD Follow up in 1 week(s).    Specialty:  Family Medicine    Contact information:    [address] [place] [address] [phone]          Follow up with Manpreet Garcia MD Follow up in 2 week(s).    Specialty:  " General Surgery    Contact information:    [address] [address]  [phone]        Activity Instructions     Activity as tolerated           Diet Instructions     Consistent Carb

## 2017-01-18 NOTE — IP AVS SNAPSHOT
AFTER VISIT SUMMARY             Haim Snell           About your hospitalization     You were admitted on:  January 18, 2017 You last received care in the:  [address]       Procedures & Surgeries      Procedure(s) (LRB):  INCISION AND DRAINAGE ABSCESS LEFT ANKLE (Left)     1/18/2017 - 1/19/2017     Surgeon(s):  Manpreet Garcia MD  -------------------      Medications    If you or your caregiver advised us that you are currently taking a medication and that medication is marked below as “Resume”, this simply indicates that we have reviewed those medications to make sure our new therapy recommendations do not interfere.  If you have concerns about medications other than those new ones which we are prescribing today, please consult the physician who prescribed them (or your primary physician).  Our review of your home medications is not meant to indicate that we are directing their use.             Your Medications      START taking these medications     amoxicillin-clavulanate 875-125 MG per tablet   Take 1 tablet by mouth 2 (Two) Times a Day.   Commonly known as:  AUGMENTIN           doxycycline 100 MG enteric coated tablet   Take 1 tablet by mouth 2 (Two) Times a Day.   Commonly known as:  DORYX             CONTINUE taking these medications     ALPRAZolam 1 MG tablet   Take 1 mg by mouth 2 (Two) Times a Day As Needed for anxiety.   Commonly known as:  XANAX           citalopram 20 MG tablet   Take 20 mg by mouth daily.   Last time this was given:  1/22/2017  8:23 AM   Commonly known as:  CeleXA           DULoxetine 60 MG capsule   Take 60 mg by mouth daily.   Last time this was given:  1/22/2017  8:23 AM   Commonly known as:  CYMBALTA           gabapentin 800 MG tablet   Take 800 mg by mouth 4 (four) times a day.   Commonly known as:  NEURONTIN           glipiZIDE 5 MG tablet   Take 5 mg by mouth 2 (two) times a day before meals.   Last time this was given:  1/22/2017  8:23 AM      Commonly known as:  GLUCOTROL           pantoprazole 40 MG EC tablet   Take 40 mg by mouth 2 (two) times a day.   Last time this was given:  1/22/2017  8:23 AM   Commonly known as:  PROTONIX           potassium chloride 10 MEQ CR capsule   Take 10 mEq by mouth daily.   Commonly known as:  MICRO-K           spironolactone 50 MG tablet   Take 25 mg by mouth Daily. Take 1/2 tablet (25mg) daily   Last time this was given:  1/21/2017  8:07 AM   Commonly known as:  ALDACTONE           traMADol 50 MG tablet   Take 50 mg by mouth 3 (Three) Times a Day As Needed for moderate pain (4-6).   Commonly known as:  ULTRAM             STOP taking these medications     multivitamin tablet tablet                Where to Get Your Medications      These medications were sent to [facility] [address] [facility] [address]  - [phone] [place] [phone]   [address] [place] [address]     Phone:  [phone]     amoxicillin-clavulanate 875-125 MG per tablet    doxycycline 100 MG enteric coated tablet                  Your Medications      Your Medication List           Morning Noon Evening Bedtime As Needed    ALPRAZolam 1 MG tablet   Take 1 mg by mouth 2 (Two) Times a Day As Needed for anxiety.   Commonly known as:  XANAX                                   amoxicillin-clavulanate 875-125 MG per tablet   Take 1 tablet by mouth 2 (Two) Times a Day.   Commonly known as:  AUGMENTIN                                      citalopram 20 MG tablet   Take 20 mg by mouth daily.   Commonly known as:  CeleXA                                   doxycycline 100 MG enteric coated tablet   Take 1 tablet by mouth 2 (Two) Times a Day.   Commonly known as:  DORYX                                      DULoxetine 60 MG capsule   Take 60 mg by mouth daily.   Commonly known as:  CYMBALTA                                   gabapentin 800 MG tablet   Take 800 mg by mouth 4 (four) times a day.    Commonly known as:  NEURONTIN                                            glipiZIDE 5 MG tablet   Take 5 mg by mouth 2 (two) times a day before meals.   Commonly known as:  GLUCOTROL                                      pantoprazole 40 MG EC tablet   Take 40 mg by mouth 2 (two) times a day.   Commonly known as:  PROTONIX                                      potassium chloride 10 MEQ CR capsule   Take 10 mEq by mouth daily.   Commonly known as:  MICRO-K                                   spironolactone 50 MG tablet   Take 25 mg by mouth Daily. Take 1/2 tablet (25mg) daily   Commonly known as:  ALDACTONE                                   traMADol 50 MG tablet   Take 50 mg by mouth 3 (Three) Times a Day As Needed for moderate pain (4-6).   Commonly known as:  ULTRAM                                            Instructions for After Discharge        Activity Instructions     Activity as tolerated           Diet Instructions     Consistent Carb             Discharge References/Attachments     CELLULITIS, EASY-TO-READ (ENGLISH)    AMOXICILLIN; CLAVULANIC ACID TABLETS (ENGLISH)    DOXYCYCLINE DELAYED-RELEASE TABLETS (ENGLISH)    INCISION AND DRAINAGE, CARE AFTER (ENGLISH)    DRESSING CHANGE, EASY-TO-READ (ENGLISH)       Follow-ups for After Discharge        Follow-up Information     Follow up with Edgar Sanon MD Follow up in 1 week(s).    Specialty:  Family Medicine    Contact information:    [address] [facility] [address]  [phone]          Follow up with Manpreet Garcia MD Follow up in 2 week(s).    Specialty:  General Surgery    Contact information:    [address] [address]  [phone]        Scheduled Appointments     Follow-up with Dr. Sanon in one week. Office Number [phone]  Follow-up with Dr. Garcia in two weeks. Office Number [phone]           [facility] Signup     Our records indicate that you have declined [facility] MyChart signup. If you would like to sign up for  Shelley, please email [email or web address] or call [phone] to obtain an activation code.         Summary of Your Hospitalization        Reason for Hospitalization     Your primary diagnosis was:  Not on File    Your diagnoses also included:  Bacterial Skin Infection Of Leg      Care Providers     Provider Service Role Specialty    Edgar Sanon MD -- Attending Provider Family Medicine    Edgar Sanon MD -- Consulting Physician  Family Medicine    Manpreet Garcia MD Surgery Consulting Physician  General Surgery    Manpreet Garcia MD Surgery Surgeon  General Surgery       Your Allergies  Date Reviewed: 1/19/2017    Allergen Reactions    Sulfa Antibiotics Not Noted      Pending Labs     Order Current Status    Blood Culture Preliminary result    Blood Culture Preliminary result      Patient Belongings Returned     Document Return of Belongings Flowsheet     Were the patient bedside belongings sent home?   Yes   Belongings Retrieved from Security & Sent Home   N/A    Belongings Sent to Safe   --   Medications Retrieved from Pharmacy & Sent Home   N/A              More Information      Cellulitis  Cellulitis is an infection of the skin and the tissue under the skin. The infected area is usually red and tender. This happens most often in the arms and lower legs.  HOME CARE   · Take your antibiotic medicine as told. Finish the medicine even if you start to feel better.  · Keep the infected arm or leg raised (elevated).  · Put a warm cloth on the area up to 4 times per day.  · Only take medicines as told by your doctor.  · Keep all doctor visits as told.  GET HELP IF:  · You see red streaks on the skin coming from the infected area.  · Your red area gets bigger or turns a dark color.  · Your bone or joint under the infected area is painful after the skin heals.  · Your infection comes back in the same area or different area.  · You have a puffy (swollen) bump in the infected area.  · You  have new symptoms.  · You have a fever.  GET HELP RIGHT AWAY IF:   · You feel very sleepy.  · You throw up (vomit) or have watery poop (diarrhea).  · You feel sick and have muscle aches and pains.     This information is not intended to replace advice given to you by your health care provider. Make sure you discuss any questions you have with your health care provider.     Document Released: 06/05/2009 Document Revised: 09/07/2016 Document Reviewed: 03/04/2013  [employer] Interactive Patient Education ©2016 Elsevier Inc.          Amoxicillin; Clavulanic Acid tablets  What is this medicine?  AMOXICILLIN; CLAVULANIC ACID (a mox i NIKA in; EJANNIE godoy ic AS id) is a penicillin antibiotic. It is used to treat certain kinds of bacterial infections. It will not work for colds, flu, or other viral infections.  This medicine may be used for other purposes; ask your health care provider or pharmacist if you have questions.  What should I tell my health care provider before I take this medicine?  They need to know if you have any of these conditions:  -bowel disease, like colitis  -kidney disease  -liver disease  -mononucleosis  -an unusual or allergic reaction to amoxicillin, penicillin, cephalosporin, other antibiotics, clavulanic acid, other medicines, foods, dyes, or preservatives  -pregnant or trying to get pregnant  -breast-feeding  How should I use this medicine?  Take this medicine by mouth with a full glass of water. Follow the directions on the prescription label. Take at the start of a meal. Do not crush or chew. If the tablet has a score line, you may cut it in half at the score line for easier swallowing. Take your medicine at regular intervals. Do not take your medicine more often than directed. Take all of your medicine as directed even if you think you are better. Do not skip doses or stop your medicine early.  Talk to your pediatrician regarding the use of this medicine in children. Special care may be  needed.  Overdosage: If you think you have taken too much of this medicine contact a poison control center or emergency room at once.  NOTE: This medicine is only for you. Do not share this medicine with others.  What if I miss a dose?  If you miss a dose, take it as soon as you can. If it is almost time for your next dose, take only that dose. Do not take double or extra doses.  What may interact with this medicine?  -allopurinol  -anticoagulants  -birth control pills  -methotrexate  -probenecid  This list may not describe all possible interactions. Give your health care provider a list of all the medicines, herbs, non-prescription drugs, or dietary supplements you use. Also tell them if you smoke, drink alcohol, or use illegal drugs. Some items may interact with your medicine.  What should I watch for while using this medicine?  Tell your doctor or health care professional if your symptoms do not improve.  Do not treat diarrhea with over the counter products. Contact your doctor if you have diarrhea that lasts more than 2 days or if it is severe and watery.  If you have diabetes, you may get a false-positive result for sugar in your urine. Check with your doctor or health care professional.  Birth control pills may not work properly while you are taking this medicine. Talk to your doctor about using an extra method of birth control.  What side effects may I notice from receiving this medicine?  Side effects that you should report to your doctor or health care professional as soon as possible:  -allergic reactions like skin rash, itching or hives, swelling of the face, lips, or tongue  -breathing problems  -dark urine  -fever or chills, sore throat  -redness, blistering, peeling or loosening of the skin, including inside the mouth  -seizures  -trouble passing urine or change in the amount of urine  -unusual bleeding, bruising  -unusually weak or tired  -white patches or sores in the mouth or throat  Side effects  that usually do not require medical attention (report to your doctor or health care professional if they continue or are bothersome):  -diarrhea  -dizziness  -headache  -nausea, vomiting  -stomach upset  -vaginal or anal irritation  This list may not describe all possible side effects. Call your doctor for medical advice about side effects. You may report side effects to FDA at 7-450-RVM-6559.  Where should I keep my medicine?  Keep out of the reach of children.  Store at room temperature below 25 degrees C (77 degrees F). Keep container tightly closed. Throw away any unused medicine after the expiration date.  NOTE: This sheet is a summary. It may not cover all possible information. If you have questions about this medicine, talk to your doctor, pharmacist, or health care provider.     © 2016, Elsevier/Gold Standard. (2009-03-12 12:04:30)          Doxycycline delayed-release tablets  What is this medicine?  DOXYCYCLINE (dox i MONSERRAT kyle) is a tetracycline antibiotic. It kills certain bacteria or stops their growth. It is used to treat many kinds of infections, like dental, skin, respiratory, and urinary tract infections. It also treats acne, Lyme disease, malaria, and certain sexually transmitted infections.  This medicine may be used for other purposes; ask your health care provider or pharmacist if you have questions.  What should I tell my health care provider before I take this medicine?  They need to know if you have any of these conditions:  -bowel disease like colitis  -liver disease  -long exposure to sunlight like working outdoors  -an unusual or allergic reaction to doxycycline, tetracycline antibiotics, other medicines, foods, dyes, or preservatives  -pregnant or trying to get pregnant  -breast-feeding  How should I use this medicine?  Take this medicine by mouth with a full glass of water. Follow the directions on the prescription label. Do not crush or chew. It is best to take this medicine without  food, but if it upsets your stomach take it with food. Take your medicine at regular intervals. Do not take your medicine more often than directed. Take all of your medicine as directed even if you think your are better. Do not skip doses or stop your medicine early.  Talk to your pediatrician regarding the use of this medicine in children. While this drug may be prescribed for selected conditions, precautions do apply.  Overdosage: If you think you have taken too much of this medicine contact a poison control center or emergency room at once.  NOTE: This medicine is only for you. Do not share this medicine with others.  What if I miss a dose?  If you miss a dose, take it as soon as you can. If it is almost time for your next dose, take only that dose. Do not take double or extra doses.  What may interact with this medicine?  -antacids  -barbiturates  -birth control pills  -bismuth subsalicylate  -carbamazepine  -methoxyflurane  -other antibiotics  -phenytoin  -vitamins that contain iron  -warfarin  This list may not describe all possible interactions. Give your health care provider a list of all the medicines, herbs, non-prescription drugs, or dietary supplements you use. Also tell them if you smoke, drink alcohol, or use illegal drugs. Some items may interact with your medicine.  What should I watch for while using this medicine?  Tell your doctor or health care professional if your symptoms do not improve.  Do not treat diarrhea with over the counter products. Contact your doctor if you have diarrhea that lasts more than 2 days or if it is severe and watery.  Do not take this medicine just before going to bed. It may not dissolve properly when you lay down and can cause pain in your throat. Drink plenty of fluids while taking this medicine to also help reduce irritation in your throat.  This medicine can make you more sensitive to the sun. Keep out of the sun. If you cannot avoid being in the sun, wear protective  clothing and use sunscreen. Do not use sun lamps or tanning beds/booths.  Birth control pills may not work properly while you are taking this medicine. Talk to your doctor about using an extra method of birth control.  If you are being treated for a sexually transmitted infection, avoid sexual contact until you have finished your treatment. Your sexual partner may also need treatment.  Avoid antacids, aluminum, calcium, magnesium, and iron products for 4 hours before and 2 hours after taking a dose of this medicine.  If you are using this medicine to prevent malaria, you should still protect yourself from contact with mosquitos. Stay in screened-in areas, use mosquito nets, keep your body covered, and use an insect repellent.  What side effects may I notice from receiving this medicine?  Side effects that you should report to your doctor or health care professional as soon as possible:  -allergic reactions like skin rash, itching or hives, swelling of the face, lips, or tongue  -difficulty breathing  -fever  -itching in the rectal or genital area  -pain on swallowing  -redness, blistering, peeling or loosening of the skin, including inside the mouth  -severe stomach pain or cramps  -unusual bleeding or bruising  -unusually weak or tired  -yellowing of the eyes or skin  Side effects that usually do not require medical attention (report to your doctor or health care professional if they continue or are bothersome):  -diarrhea  -loss of appetite  -nausea, vomiting  This list may not describe all possible side effects. Call your doctor for medical advice about side effects. You may report side effects to FDA at 4-955-FDA-6138.  Where should I keep my medicine?  Keep out of the reach of children.  Store at room temperature between 15 and 30 degrees C (59 and 86 degrees F). Protect from light. Keep container tightly closed. Throw away any unused medicine after the expiration date. Taking this medicine after the expiration  date can make you seriously ill.  NOTE: This sheet is a summary. It may not cover all possible information. If you have questions about this medicine, talk to your doctor, pharmacist, or health care provider.     © 2016, [employer]. (2016-04-08 12:07:47)          Incision and Drainage, Care After  Refer to this sheet in the next few weeks. These instructions provide you with information on caring for yourself after your procedure. Your caregiver may also give you more specific instructions. Your treatment has been planned according to current medical practices, but problems sometimes occur. Call your caregiver if you have any problems or questions after your procedure.  HOME CARE INSTRUCTIONS   · If antibiotic medicine is given, take it as directed. Finish it even if you start to feel better.  · Only take over-the-counter or prescription medicines for pain, discomfort, or fever as directed by your caregiver.  · Keep all follow-up appointments as directed by your caregiver.  · Change any bandages (dressings) as directed by your caregiver. Replace old dressings with clean dressings.  · Wash your hands before and after caring for your wound.  You will receive specific instructions for cleansing and caring for your wound.   SEEK MEDICAL CARE IF:   · You have increased pain, swelling, or redness around the wound.  · You have increased drainage, smell, or bleeding from the wound.  · You have muscle aches, chills, or you feel generally sick.  · You have a fever.  MAKE SURE YOU:   · Understand these instructions.  · Will watch your condition.  · Will get help right away if you are not doing well or get worse.     This information is not intended to replace advice given to you by your health care provider. Make sure you discuss any questions you have with your health care provider.     Document Released: 03/11/2013 Document Revised: 01/08/2016 Document Reviewed: 03/11/2013  [employer] Interactive Patient Education  ©2016 Elsevier Inc.          Dressing Change  A dressing is a material placed over wounds. It keeps the wound clean, dry, and protected from further injury.   BEFORE YOU BEGIN  · Get your supplies together. Things you may need include:    Salt solution (saline).    Flexible gauze bandage.    Tape.    Gloves.        Gauze squares.    Plastic bags.  · Take pain medicine 30 minutes before the bandage change if you need it.  · Take a shower before you do the first bandage change of the day. Put plastic wrap or a bag over the dressing.  REMOVING YOUR OLD BANDAGE  · Wash your hands with soap and water. Dry your hands with a clean towel.  · Put on your gloves.  · Remove any tape.  · Remove the old bandage as told. If it sticks, put a small amount of warm water on it to loosen the bandage.  · Remove any gauze or packing tape in your wound.  · Take off your gloves.  · Put the gloves, tape, gauze, or any packing tape in a plastic bag.  CHANGING YOUR BANDAGE  · Open the supplies.  · Take the cap off the salt solution.  · Open the gauze. Leave the gauze on the inside of the package.  · Put on your gloves.  · Clean your wound as told by your doctor.  · Keep your wound dry if your doctor told you to do so.  · Your doctor may tell you to do one or more of the following:     the gauze. Pour the salt solution over the gauze. Squeeze out the extra salt solution.        Put solution soaked gauze only in your wound, not on the skin around it.    Pack your wound loosely.    Put dry gauze on your wound.    Put belly pads over the dry gauze if your bandages soak through.  · Tape the bandage in place so it will not fall off. Do not wrap the tape all the way around your arm or leg.  · Wrap the bandage with the flexible gauze bandage as told by your doctor.  · Take off your gloves. Put them in the plastic bag with the old bandage. Tie the bag shut and throw it away.  · Keep the bandage clean and dry.  · Wash your hands.  GET HELP  RIGHT AWAY IF:   · Your skin around the wound looks red.  · Your wound feels more tender or sore.  · You see yellowish-white fluid (pus) in the wound.  · Your wound smells bad.  · You have a fever.  · Your skin around the wound has a red rash that itches and burns.  · You see black or yellow skin in your wound that was not there before.  · You feel sick to your stomach (nauseous), throw up (vomit), and feel very tired.     This information is not intended to replace advice given to you by your health care provider. Make sure you discuss any questions you have with your health care provider.     Document Released: 03/16/2010 Document Revised: 01/08/2016 Document Reviewed: 10/28/2012  [employer] Interactive Patient Education ©2016 [employer]         PREVENTING SURGICAL SITE INFECTIONS     Surgical Site Infections FAQs  What is a Surgical Site Infection (SSI)?  A surgical site infection is an infection that occurs after surgery in the part of the body where the surgery took place. Most patients who have surgery do not develop an infection. However, infections develop in about 1 to 3 out of every 100 patients who have surgery.  Some of the common symptoms of a surgical site infection are:  · Redness and pain around the area where you had surgery  · Drainage of cloudy fluid from your surgical wound  · Fever  Can SSIs be treated?  Yes. Most surgical site infections can be treated with antibiotics. The antibiotic given to you depends on the bacteria (germs) causing the infection. Sometimes patients with SSIs also need another surgery to treat the infection.  What are some of the things that hospitals are doing to prevent SSIs?  To prevent SSIs, doctors, nurses, and other healthcare providers:  · Clean their hands and arms up to their elbows with an antiseptic agent just before the surgery.  · Clean their hands with soap and water or an alcohol-based hand rub before and after caring for each patient.  · May remove some of  your hair immediately before your surgery using electric clippers if the hair is in the same area where the procedure will occur. They should not shave you with a razor.  · Wear special hair covers, masks, gowns, and gloves during surgery to keep the surgery area clean.  · Give you antibiotics before your surgery starts. In most cases, you should get antibiotics within 60 minutes before the surgery starts and the antibiotics should be stopped within 24 hours after surgery.  · Clean the skin at the site of your surgery with a special soap that kills germs.  What can I do to help prevent SSIs?  Before your surgery:  · Tell your doctor about other medical problems you may have. Health problems such as allergies, diabetes, and obesity could affect your surgery and your treatment.  · Quit smoking. Patients who smoke get more infections. Talk to your doctor about how you can quit before your surgery.  · Do not shave near where you will have surgery. Shaving with a razor can irritate your skin and make it easier to develop an infection.  At the time of your surgery:  · Speak up if someone tries to shave you with a razor before surgery. Ask why you need to be shaved and talk with your surgeon if you have any concerns.  · Ask if you will get antibiotics before surgery.  After your surgery:  · Make sure that your healthcare providers clean their hands before examining you, either with soap and water or an alcohol-based hand rub.    If you do not see your providers clean their hands, please ask them to do so.  · Family and friends who visit you should not touch the surgical wound or dressings.  · Family and friends should clean their hands with soap and water or an alcohol-based hand rub before and after visiting you. If you do not see them clean their hands, ask them to clean their hands.  What do I need to do when I go home from the hospital?  · Before you go home, your doctor or nurse should explain everything you need to  know about taking care of your wound. Make sure you understand how to care for your wound before you leave the hospital.  · Always clean your hands before and after caring for your wound.  · Before you go home, make sure you know who to contact if you have questions or problems after you get home.  · If you have any symptoms of an infection, such as redness and pain at the surgery site, drainage, or fever, call your doctor immediately.  If you have additional questions, please ask your doctor or nurse.  Developed and co-sponsored by The Society for Healthcare Epidemiology of [place] (SHEA); Infectious Diseases Society of [place] (IDSA); American Hospital Association; Association for Professionals in Infection Control and Epidemiology (APIC); Centers for Disease Control and Prevention (CDC); and The Joint Commission.     This information is not intended to replace advice given to you by your health care provider. Make sure you discuss any questions you have with your health care provider.     Document Released: 12/23/2014 Document Revised: 01/08/2016 Document Reviewed: 03/02/2016  [employer] Patient Education ©2016 [employer]             SYMPTOMS OF A STROKE    Call 911 or have someone take you to the Emergency Department if you have any of the following:    · Sudden numbness or weakness of your face, arm or leg especially on one side of the body  · Sudden confusion, diffiiculty speaking or trouble understanding   · Changes in your vision or loss of sight in one eye  · Sudden severe headache with no known cause  · sudden dizziness, trouble walking, loss of balance or coordination    It is important to seek emergency care right away if you have further stroke symptoms. If you get emergency help quickly, the powerful clot-dissolving medicines can reduce the disabilities caused by a stroke.     For more information:    American Stroke Association  0-088-6-STROKE  www.strokeassociation.org           IF YOU  SMOKE OR USE TOBACCO PLEASE READ THE FOLLOWING:    Why is smoking bad for me?  Smoking increases the risk of heart disease, lung disease, vascular disease, stroke, and cancer.     If you smoke, STOP!    If you would like more information on quitting smoking, please visit the [facility] website: [email or web address]   This link will provide additional resources including the QUIT line and the Beat the Pack support groups.     For more information:    American Cancer Society  ([phone]    American Heart Association  1-816.159.8854               YOU ARE THE MOST IMPORTANT FACTOR IN YOUR RECOVERY.     Follow all instructions carefully.     I have reviewed my discharge instructions with my nurse, including the following information, if applicable:     Information about my illness and diagnosis   Follow up appointments (including lab draws)   Wound Care   Equipment Needs   Medications (new and continuing) along with side effects   Preventative information such as vaccines and smoking cessations   Diet   Pain   I know when to contact my Doctor's office or seek emergency care      I want my nurse to describe the side effects of my medications: YES NO   If the answer is no, I understand the side effects of my medications: YES NO   My nurse described the side effects of my medications in a way that I could understand: YES NO   I have taken my personal belongings and my own medications with me at discharge: YES NO            I have received this information and my questions have been answered. I have discussed any concerns I see with this plan with the nurse or physician. I understand these instructions.    Signature of Patient or Responsible Person: _____________________________________    Date: _________________  Time: __________________    Signature of Healthcare Provider: _______________________________________  Date: _________________  Time: __________________

## 2017-01-18 NOTE — IP AVS SNAPSHOT
INTER-FACILITY TRANSFER   AFTER VISIT SUMMARY             Haim Snell            Summary of Your Hospitalization        About Your Hospitalization     You were admitted on:  January 18, 2017 You last received care in the:  [address]      Reason for Hospitalization     Your primary diagnosis was:  Not on File    Your diagnoses also included:  Bacterial Skin Infection Of Leg      Care Providers     Provider Service Role Specialty    Edgar Sanon MD -- Attending Provider Family Medicine    Edgar Sanon MD -- Consulting Physician  Family Medicine    Manpreet Garcia MD Surgery Consulting Physician  General Surgery    Manpreet Garcia MD Surgery Surgeon  General Surgery       Your Allergies  Date Reviewed: 1/19/2017    Allergen Reactions    Sulfa Antibiotics Not Noted      Pending Labs     Order Current Status    Blood Culture Preliminary result    Blood Culture Preliminary result       Medications    Based on the information you provided to us as well as any changes during this visit, the following is your updated medication list.  This is subject to change based on the care your receive at the receiving facility.  You should receive a new list once you are discharged.      If you have any questions or concerns, contact your primary care physician's office.             Your Medications      START taking these medications     amoxicillin-clavulanate 875-125 MG per tablet   Take 1 tablet by mouth 2 (Two) Times a Day.   Commonly known as:  AUGMENTIN           doxycycline 100 MG enteric coated tablet   Take 1 tablet by mouth 2 (Two) Times a Day.   Commonly known as:  DORYX             CONTINUE taking these medications     ALPRAZolam 1 MG tablet   Take 1 mg by mouth 2 (Two) Times a Day As Needed for anxiety.   Commonly known as:  XANAX           citalopram 20 MG tablet   Take 20 mg by mouth daily.   Last time this was given:  1/22/2017  8:23 AM   Commonly known as:   CeleXA           DULoxetine 60 MG capsule   Take 60 mg by mouth daily.   Last time this was given:  1/22/2017  8:23 AM   Commonly known as:  CYMBALTA           gabapentin 800 MG tablet   Take 800 mg by mouth 4 (four) times a day.   Commonly known as:  NEURONTIN           glipiZIDE 5 MG tablet   Take 5 mg by mouth 2 (two) times a day before meals.   Last time this was given:  1/22/2017  8:23 AM   Commonly known as:  GLUCOTROL           pantoprazole 40 MG EC tablet   Take 40 mg by mouth 2 (two) times a day.   Last time this was given:  1/22/2017  8:23 AM   Commonly known as:  PROTONIX           potassium chloride 10 MEQ CR capsule   Take 10 mEq by mouth daily.   Commonly known as:  MICRO-K           spironolactone 50 MG tablet   Take 25 mg by mouth Daily. Take 1/2 tablet (25mg) daily   Last time this was given:  1/21/2017  8:07 AM   Commonly known as:  ALDACTONE           traMADol 50 MG tablet   Take 50 mg by mouth 3 (Three) Times a Day As Needed for moderate pain (4-6).   Commonly known as:  ULTRAM             STOP taking these medications     multivitamin tablet tablet                Where to Get Your Medications      These medications were sent to [facility] [address] [address] [facility] [address] [facility] [place] [address] [facility] [phone] [place] [phone]   [address] [address] [place] [address]     Phone:  [phone]     amoxicillin-clavulanate 875-125 MG per tablet    doxycycline 100 MG enteric coated tablet                Additional Instructions    Information is subject to change based on the care your receive at the receiving facility.  If you have any questions or concerns, contact your primary care physician's office.          Activity Instructions     Activity as tolerated           Diet Instructions     Consistent Carb              Follow-ups for After Discharge        Follow-up Information     Follow up with Edgar Sanon MD Follow up in 1 week(s).    Specialty:  Family  Medicine    Contact information:    [address] [place] [address]  [phone]          Follow up with Manpreet Garcia MD Follow up in 2 week(s).    Specialty:  General Surgery    Contact information:    [address] [place] [address]  [phone]        Scheduled Appointments     Follow-up with Dr. Sanon in one week. Office Number [phone]  Follow-up with Dr. Garcia in two weeks. Office Number [phone]                      Patient Signature:  ____________________________________________________________    Date:  ____________________________________________________________        More Information      Cellulitis  Cellulitis is an infection of the skin and the tissue under the skin. The infected area is usually red and tender. This happens most often in the arms and lower legs.  HOME CARE   · Take your antibiotic medicine as told. Finish the medicine even if you start to feel better.  · Keep the infected arm or leg raised (elevated).  · Put a warm cloth on the area up to 4 times per day.  · Only take medicines as told by your doctor.  · Keep all doctor visits as told.  GET HELP IF:  · You see red streaks on the skin coming from the infected area.  · Your red area gets bigger or turns a dark color.  · Your bone or joint under the infected area is painful after the skin heals.  · Your infection comes back in the same area or different area.  · You have a puffy (swollen) bump in the infected area.  · You have new symptoms.  · You have a fever.  GET HELP RIGHT AWAY IF:   · You feel very sleepy.  · You throw up (vomit) or have watery poop (diarrhea).  · You feel sick and have muscle aches and pains.     This information is not intended to replace advice given to you by your health care provider. Make sure you discuss any questions you have with your health care provider.     Document Released: 06/05/2009 Document Revised: 09/07/2016 Document Reviewed: 03/04/2013  Elsevier Interactive Patient  Education ©2016 Elsevier Inc.          Amoxicillin; Clavulanic Acid tablets  What is this medicine?  AMOXICILLIN; CLAVULANIC ACID (a mox i NIKA in; JEANNIE laura AS id) is a penicillin antibiotic. It is used to treat certain kinds of bacterial infections. It will not work for colds, flu, or other viral infections.  This medicine may be used for other purposes; ask your health care provider or pharmacist if you have questions.  What should I tell my health care provider before I take this medicine?  They need to know if you have any of these conditions:  -bowel disease, like colitis  -kidney disease  -liver disease  -mononucleosis  -an unusual or allergic reaction to amoxicillin, penicillin, cephalosporin, other antibiotics, clavulanic acid, other medicines, foods, dyes, or preservatives  -pregnant or trying to get pregnant  -breast-feeding  How should I use this medicine?  Take this medicine by mouth with a full glass of water. Follow the directions on the prescription label. Take at the start of a meal. Do not crush or chew. If the tablet has a score line, you may cut it in half at the score line for easier swallowing. Take your medicine at regular intervals. Do not take your medicine more often than directed. Take all of your medicine as directed even if you think you are better. Do not skip doses or stop your medicine early.  Talk to your pediatrician regarding the use of this medicine in children. Special care may be needed.  Overdosage: If you think you have taken too much of this medicine contact a poison control center or emergency room at once.  NOTE: This medicine is only for you. Do not share this medicine with others.  What if I miss a dose?  If you miss a dose, take it as soon as you can. If it is almost time for your next dose, take only that dose. Do not take double or extra doses.  What may interact with this medicine?  -allopurinol  -anticoagulants  -birth control  pills  -methotrexate  -probenecid  This list may not describe all possible interactions. Give your health care provider a list of all the medicines, herbs, non-prescription drugs, or dietary supplements you use. Also tell them if you smoke, drink alcohol, or use illegal drugs. Some items may interact with your medicine.  What should I watch for while using this medicine?  Tell your doctor or health care professional if your symptoms do not improve.  Do not treat diarrhea with over the counter products. Contact your doctor if you have diarrhea that lasts more than 2 days or if it is severe and watery.  If you have diabetes, you may get a false-positive result for sugar in your urine. Check with your doctor or health care professional.  Birth control pills may not work properly while you are taking this medicine. Talk to your doctor about using an extra method of birth control.  What side effects may I notice from receiving this medicine?  Side effects that you should report to your doctor or health care professional as soon as possible:  -allergic reactions like skin rash, itching or hives, swelling of the face, lips, or tongue  -breathing problems  -dark urine  -fever or chills, sore throat  -redness, blistering, peeling or loosening of the skin, including inside the mouth  -seizures  -trouble passing urine or change in the amount of urine  -unusual bleeding, bruising  -unusually weak or tired  -white patches or sores in the mouth or throat  Side effects that usually do not require medical attention (report to your doctor or health care professional if they continue or are bothersome):  -diarrhea  -dizziness  -headache  -nausea, vomiting  -stomach upset  -vaginal or anal irritation  This list may not describe all possible side effects. Call your doctor for medical advice about side effects. You may report side effects to FDA at 7-577-FDA-9681.  Where should I keep my medicine?  Keep out of the reach of  children.  Store at room temperature below 25 degrees C (77 degrees F). Keep container tightly closed. Throw away any unused medicine after the expiration date.  NOTE: This sheet is a summary. It may not cover all possible information. If you have questions about this medicine, talk to your doctor, pharmacist, or health care provider.     © 2016, Elsevier/Gold Standard. (2009-03-12 12:04:30)          Doxycycline delayed-release tablets  What is this medicine?  DOXYCYCLINE (dox gilda kyle) is a tetracycline antibiotic. It kills certain bacteria or stops their growth. It is used to treat many kinds of infections, like dental, skin, respiratory, and urinary tract infections. It also treats acne, Lyme disease, malaria, and certain sexually transmitted infections.  This medicine may be used for other purposes; ask your health care provider or pharmacist if you have questions.  What should I tell my health care provider before I take this medicine?  They need to know if you have any of these conditions:  -bowel disease like colitis  -liver disease  -long exposure to sunlight like working outdoors  -an unusual or allergic reaction to doxycycline, tetracycline antibiotics, other medicines, foods, dyes, or preservatives  -pregnant or trying to get pregnant  -breast-feeding  How should I use this medicine?  Take this medicine by mouth with a full glass of water. Follow the directions on the prescription label. Do not crush or chew. It is best to take this medicine without food, but if it upsets your stomach take it with food. Take your medicine at regular intervals. Do not take your medicine more often than directed. Take all of your medicine as directed even if you think your are better. Do not skip doses or stop your medicine early.  Talk to your pediatrician regarding the use of this medicine in children. While this drug may be prescribed for selected conditions, precautions do apply.  Overdosage: If you think you have  taken too much of this medicine contact a poison control center or emergency room at once.  NOTE: This medicine is only for you. Do not share this medicine with others.  What if I miss a dose?  If you miss a dose, take it as soon as you can. If it is almost time for your next dose, take only that dose. Do not take double or extra doses.  What may interact with this medicine?  -antacids  -barbiturates  -birth control pills  -bismuth subsalicylate  -carbamazepine  -methoxyflurane  -other antibiotics  -phenytoin  -vitamins that contain iron  -warfarin  This list may not describe all possible interactions. Give your health care provider a list of all the medicines, herbs, non-prescription drugs, or dietary supplements you use. Also tell them if you smoke, drink alcohol, or use illegal drugs. Some items may interact with your medicine.  What should I watch for while using this medicine?  Tell your doctor or health care professional if your symptoms do not improve.  Do not treat diarrhea with over the counter products. Contact your doctor if you have diarrhea that lasts more than 2 days or if it is severe and watery.  Do not take this medicine just before going to bed. It may not dissolve properly when you lay down and can cause pain in your throat. Drink plenty of fluids while taking this medicine to also help reduce irritation in your throat.  This medicine can make you more sensitive to the sun. Keep out of the sun. If you cannot avoid being in the sun, wear protective clothing and use sunscreen. Do not use sun lamps or tanning beds/booths.  Birth control pills may not work properly while you are taking this medicine. Talk to your doctor about using an extra method of birth control.  If you are being treated for a sexually transmitted infection, avoid sexual contact until you have finished your treatment. Your sexual partner may also need treatment.  Avoid antacids, aluminum, calcium, magnesium, and iron products for 4  hours before and 2 hours after taking a dose of this medicine.  If you are using this medicine to prevent malaria, you should still protect yourself from contact with mosquitos. Stay in screened-in areas, use mosquito nets, keep your body covered, and use an insect repellent.  What side effects may I notice from receiving this medicine?  Side effects that you should report to your doctor or health care professional as soon as possible:  -allergic reactions like skin rash, itching or hives, swelling of the face, lips, or tongue  -difficulty breathing  -fever  -itching in the rectal or genital area  -pain on swallowing  -redness, blistering, peeling or loosening of the skin, including inside the mouth  -severe stomach pain or cramps  -unusual bleeding or bruising  -unusually weak or tired  -yellowing of the eyes or skin  Side effects that usually do not require medical attention (report to your doctor or health care professional if they continue or are bothersome):  -diarrhea  -loss of appetite  -nausea, vomiting  This list may not describe all possible side effects. Call your doctor for medical advice about side effects. You may report side effects to FDA at 1-912-FDA-9483.  Where should I keep my medicine?  Keep out of the reach of children.  Store at room temperature between 15 and 30 degrees C (59 and 86 degrees F). Protect from light. Keep container tightly closed. Throw away any unused medicine after the expiration date. Taking this medicine after the expiration date can make you seriously ill.  NOTE: This sheet is a summary. It may not cover all possible information. If you have questions about this medicine, talk to your doctor, pharmacist, or health care provider.     © 2016, Elsevier/Gold Standard. (2016-04-08 12:07:47)          Incision and Drainage, Care After  Refer to this sheet in the next few weeks. These instructions provide you with information on caring for yourself after your procedure. Your  caregiver may also give you more specific instructions. Your treatment has been planned according to current medical practices, but problems sometimes occur. Call your caregiver if you have any problems or questions after your procedure.  HOME CARE INSTRUCTIONS   · If antibiotic medicine is given, take it as directed. Finish it even if you start to feel better.  · Only take over-the-counter or prescription medicines for pain, discomfort, or fever as directed by your caregiver.  · Keep all follow-up appointments as directed by your caregiver.  · Change any bandages (dressings) as directed by your caregiver. Replace old dressings with clean dressings.  · Wash your hands before and after caring for your wound.  You will receive specific instructions for cleansing and caring for your wound.   SEEK MEDICAL CARE IF:   · You have increased pain, swelling, or redness around the wound.  · You have increased drainage, smell, or bleeding from the wound.  · You have muscle aches, chills, or you feel generally sick.  · You have a fever.  MAKE SURE YOU:   · Understand these instructions.  · Will watch your condition.  · Will get help right away if you are not doing well or get worse.     This information is not intended to replace advice given to you by your health care provider. Make sure you discuss any questions you have with your health care provider.     Document Released: 03/11/2013 Document Revised: 01/08/2016 Document Reviewed: 03/11/2013  [employer] Interactive Patient Education ©2016 [employer].          Dressing Change  A dressing is a material placed over wounds. It keeps the wound clean, dry, and protected from further injury.   BEFORE YOU BEGIN  · Get your supplies together. Things you may need include:    Salt solution (saline).    Flexible gauze bandage.    Tape.    Gloves.        Gauze squares.    Plastic bags.  · Take pain medicine 30 minutes before the bandage change if you need it.  · Take a shower before you  do the first bandage change of the day. Put plastic wrap or a bag over the dressing.  REMOVING YOUR OLD BANDAGE  · Wash your hands with soap and water. Dry your hands with a clean towel.  · Put on your gloves.  · Remove any tape.  · Remove the old bandage as told. If it sticks, put a small amount of warm water on it to loosen the bandage.  · Remove any gauze or packing tape in your wound.  · Take off your gloves.  · Put the gloves, tape, gauze, or any packing tape in a plastic bag.  CHANGING YOUR BANDAGE  · Open the supplies.  · Take the cap off the salt solution.  · Open the gauze. Leave the gauze on the inside of the package.  · Put on your gloves.  · Clean your wound as told by your doctor.  · Keep your wound dry if your doctor told you to do so.  · Your doctor may tell you to do one or more of the following:     the gauze. Pour the salt solution over the gauze. Squeeze out the extra salt solution.        Put solution soaked gauze only in your wound, not on the skin around it.    Pack your wound loosely.    Put dry gauze on your wound.    Put belly pads over the dry gauze if your bandages soak through.  · Tape the bandage in place so it will not fall off. Do not wrap the tape all the way around your arm or leg.  · Wrap the bandage with the flexible gauze bandage as told by your doctor.  · Take off your gloves. Put them in the plastic bag with the old bandage. Tie the bag shut and throw it away.  · Keep the bandage clean and dry.  · Wash your hands.  GET HELP RIGHT AWAY IF:   · Your skin around the wound looks red.  · Your wound feels more tender or sore.  · You see yellowish-white fluid (pus) in the wound.  · Your wound smells bad.  · You have a fever.  · Your skin around the wound has a red rash that itches and burns.  · You see black or yellow skin in your wound that was not there before.  · You feel sick to your stomach (nauseous), throw up (vomit), and feel very tired.     This information is not  intended to replace advice given to you by your health care provider. Make sure you discuss any questions you have with your health care provider.     Document Released: 03/16/2010 Document Revised: 01/08/2016 Document Reviewed: 10/28/2012  [employer] Interactive Patient Education ©2016 Elsevier Inc.

## 2017-01-18 NOTE — Clinical Note
Level of Care: Med/Surg [1]   Admitting Physician: CLARA POSADAS [[id number]]   Attending Physician: CLARA POSADAS [[id number]]   Patient Class: Observation [104]

## 2017-01-19 ENCOUNTER — ANESTHESIA EVENT (OUTPATIENT)
Dept: PERIOP | Facility: HOSPITAL | Age: 57
End: 2017-01-19

## 2017-01-19 ENCOUNTER — ANESTHESIA (OUTPATIENT)
Dept: PERIOP | Facility: HOSPITAL | Age: 57
End: 2017-01-19

## 2017-01-19 LAB
GLUCOSE BLDC GLUCOMTR-MCNC: 122 MG/DL (ref 70–130)
GLUCOSE BLDC GLUCOMTR-MCNC: 134 MG/DL (ref 70–130)
GLUCOSE BLDC GLUCOMTR-MCNC: 219 MG/DL (ref 70–130)
GLUCOSE BLDC GLUCOMTR-MCNC: 250 MG/DL (ref 70–130)

## 2017-01-19 PROCEDURE — 25010000002 PROPOFOL 1000 MG/ML EMULSION: Performed by: NURSE ANESTHETIST, CERTIFIED REGISTERED

## 2017-01-19 PROCEDURE — 82962 GLUCOSE BLOOD TEST: CPT

## 2017-01-19 PROCEDURE — 25010000002 FENTANYL CITRATE (PF) 100 MCG/2ML SOLUTION: Performed by: NURSE ANESTHETIST, CERTIFIED REGISTERED

## 2017-01-19 PROCEDURE — 0H9NXZZ DRAINAGE OF LEFT FOOT SKIN, EXTERNAL APPROACH: ICD-10-PCS | Performed by: SURGERY

## 2017-01-19 PROCEDURE — 25010000002 MIDAZOLAM PER 1 MG: Performed by: NURSE ANESTHETIST, CERTIFIED REGISTERED

## 2017-01-19 PROCEDURE — 10060 I&D ABSCESS SIMPLE/SINGLE: CPT | Performed by: SURGERY

## 2017-01-19 PROCEDURE — 25010000002 PIPERACILLIN-TAZOBACTAM: Performed by: FAMILY MEDICINE

## 2017-01-19 PROCEDURE — 25010000002 PROPOFOL 10 MG/ML EMULSION: Performed by: NURSE ANESTHETIST, CERTIFIED REGISTERED

## 2017-01-19 PROCEDURE — 25010000002 VANCOMYCIN PER 500 MG

## 2017-01-19 PROCEDURE — 63710000001 INSULIN ASPART PER 5 UNITS: Performed by: FAMILY MEDICINE

## 2017-01-19 PROCEDURE — 99253 IP/OBS CNSLTJ NEW/EST LOW 45: CPT | Performed by: SURGERY

## 2017-01-19 PROCEDURE — 25010000002 MORPHINE SULFATE (PF) 2 MG/ML SOLUTION: Performed by: FAMILY MEDICINE

## 2017-01-19 RX ORDER — MAGNESIUM HYDROXIDE 1200 MG/15ML
LIQUID ORAL AS NEEDED
Status: DISCONTINUED | OUTPATIENT
Start: 2017-01-19 | End: 2017-01-19 | Stop reason: HOSPADM

## 2017-01-19 RX ORDER — SODIUM CHLORIDE, SODIUM LACTATE, POTASSIUM CHLORIDE, CALCIUM CHLORIDE 600; 310; 30; 20 MG/100ML; MG/100ML; MG/100ML; MG/100ML
125 INJECTION, SOLUTION INTRAVENOUS CONTINUOUS
Status: DISCONTINUED | OUTPATIENT
Start: 2017-01-19 | End: 2017-01-20

## 2017-01-19 RX ORDER — LIDOCAINE HYDROCHLORIDE 20 MG/ML
INJECTION, SOLUTION INFILTRATION; PERINEURAL AS NEEDED
Status: DISCONTINUED | OUTPATIENT
Start: 2017-01-19 | End: 2017-01-19 | Stop reason: SURG

## 2017-01-19 RX ORDER — PROPOFOL 10 MG/ML
VIAL (ML) INTRAVENOUS AS NEEDED
Status: DISCONTINUED | OUTPATIENT
Start: 2017-01-19 | End: 2017-01-19 | Stop reason: SURG

## 2017-01-19 RX ORDER — BUPIVACAINE HYDROCHLORIDE AND EPINEPHRINE 2.5; 5 MG/ML; UG/ML
INJECTION, SOLUTION EPIDURAL; INFILTRATION; INTRACAUDAL; PERINEURAL AS NEEDED
Status: DISCONTINUED | OUTPATIENT
Start: 2017-01-19 | End: 2017-01-19 | Stop reason: HOSPADM

## 2017-01-19 RX ORDER — MIDAZOLAM HYDROCHLORIDE 1 MG/ML
INJECTION INTRAMUSCULAR; INTRAVENOUS AS NEEDED
Status: DISCONTINUED | OUTPATIENT
Start: 2017-01-19 | End: 2017-01-19 | Stop reason: SURG

## 2017-01-19 RX ORDER — FENTANYL CITRATE 50 UG/ML
INJECTION, SOLUTION INTRAMUSCULAR; INTRAVENOUS AS NEEDED
Status: DISCONTINUED | OUTPATIENT
Start: 2017-01-19 | End: 2017-01-19 | Stop reason: SURG

## 2017-01-19 RX ORDER — SODIUM CHLORIDE 0.9 % (FLUSH) 0.9 %
1-10 SYRINGE (ML) INJECTION AS NEEDED
Status: DISCONTINUED | OUTPATIENT
Start: 2017-01-19 | End: 2017-01-19 | Stop reason: HOSPADM

## 2017-01-19 RX ADMIN — PIPERACILLIN SODIUM,TAZOBACTAM SODIUM 3.38 G: 3; .375 INJECTION, POWDER, FOR SOLUTION INTRAVENOUS at 17:16

## 2017-01-19 RX ADMIN — PANTOPRAZOLE SODIUM 40 MG: 40 TABLET, DELAYED RELEASE ORAL at 17:15

## 2017-01-19 RX ADMIN — MIDAZOLAM HYDROCHLORIDE 2 MG: 1 INJECTION, SOLUTION INTRAMUSCULAR; INTRAVENOUS at 12:36

## 2017-01-19 RX ADMIN — PIPERACILLIN SODIUM,TAZOBACTAM SODIUM 3.38 G: 3; .375 INJECTION, POWDER, FOR SOLUTION INTRAVENOUS at 05:13

## 2017-01-19 RX ADMIN — INSULIN ASPART 3 UNITS: 100 INJECTION, SOLUTION INTRAVENOUS; SUBCUTANEOUS at 21:32

## 2017-01-19 RX ADMIN — MORPHINE SULFATE 2 MG: 2 INJECTION, SOLUTION INTRAMUSCULAR; INTRAVENOUS at 14:20

## 2017-01-19 RX ADMIN — LIDOCAINE HYDROCHLORIDE 60 MG: 20 INJECTION, SOLUTION INFILTRATION; PERINEURAL at 12:39

## 2017-01-19 RX ADMIN — POTASSIUM CHLORIDE 10 MEQ: 750 TABLET, FILM COATED, EXTENDED RELEASE ORAL at 08:15

## 2017-01-19 RX ADMIN — GLIPIZIDE 5 MG: 5 TABLET ORAL at 08:16

## 2017-01-19 RX ADMIN — MORPHINE SULFATE 2 MG: 2 INJECTION, SOLUTION INTRAMUSCULAR; INTRAVENOUS at 05:13

## 2017-01-19 RX ADMIN — GLIPIZIDE 5 MG: 5 TABLET ORAL at 17:15

## 2017-01-19 RX ADMIN — FENTANYL CITRATE 100 MCG: 50 INJECTION INTRAMUSCULAR; INTRAVENOUS at 12:36

## 2017-01-19 RX ADMIN — PROPOFOL 150 MCG/KG/MIN: 10 INJECTION, EMULSION INTRAVENOUS at 12:39

## 2017-01-19 RX ADMIN — GABAPENTIN 800 MG: 400 CAPSULE ORAL at 13:40

## 2017-01-19 RX ADMIN — VANCOMYCIN HYDROCHLORIDE 1500 MG: 5 INJECTION, POWDER, LYOPHILIZED, FOR SOLUTION INTRAVENOUS at 20:25

## 2017-01-19 RX ADMIN — MORPHINE SULFATE 2 MG: 2 INJECTION, SOLUTION INTRAMUSCULAR; INTRAVENOUS at 08:26

## 2017-01-19 RX ADMIN — MORPHINE SULFATE 2 MG: 2 INJECTION, SOLUTION INTRAMUSCULAR; INTRAVENOUS at 17:23

## 2017-01-19 RX ADMIN — DULOXETINE HYDROCHLORIDE 60 MG: 60 CAPSULE, DELAYED RELEASE ORAL at 08:16

## 2017-01-19 RX ADMIN — VANCOMYCIN HYDROCHLORIDE 1500 MG: 5 INJECTION, POWDER, LYOPHILIZED, FOR SOLUTION INTRAVENOUS at 08:16

## 2017-01-19 RX ADMIN — INSULIN ASPART 4 UNITS: 100 INJECTION, SOLUTION INTRAVENOUS; SUBCUTANEOUS at 17:15

## 2017-01-19 RX ADMIN — SODIUM CHLORIDE, POTASSIUM CHLORIDE, SODIUM LACTATE AND CALCIUM CHLORIDE 125 ML/HR: 600; 310; 30; 20 INJECTION, SOLUTION INTRAVENOUS at 20:25

## 2017-01-19 RX ADMIN — INSULIN ASPART 4 UNITS: 100 INJECTION, SOLUTION INTRAVENOUS; SUBCUTANEOUS at 08:16

## 2017-01-19 RX ADMIN — SODIUM CHLORIDE, POTASSIUM CHLORIDE, SODIUM LACTATE AND CALCIUM CHLORIDE 125 ML/HR: 600; 310; 30; 20 INJECTION, SOLUTION INTRAVENOUS at 12:08

## 2017-01-19 RX ADMIN — GABAPENTIN 800 MG: 400 CAPSULE ORAL at 17:15

## 2017-01-19 RX ADMIN — MORPHINE SULFATE 2 MG: 2 INJECTION, SOLUTION INTRAMUSCULAR; INTRAVENOUS at 20:26

## 2017-01-19 RX ADMIN — GABAPENTIN 800 MG: 400 CAPSULE ORAL at 05:13

## 2017-01-19 RX ADMIN — SPIRONOLACTONE 25 MG: 25 TABLET, FILM COATED ORAL at 08:15

## 2017-01-19 RX ADMIN — CITALOPRAM HYDROBROMIDE 20 MG: 20 TABLET ORAL at 08:15

## 2017-01-19 RX ADMIN — PROPOFOL 50 MG: 10 INJECTION, EMULSION INTRAVENOUS at 12:39

## 2017-01-19 RX ADMIN — PANTOPRAZOLE SODIUM 40 MG: 40 TABLET, DELAYED RELEASE ORAL at 08:15

## 2017-01-19 NOTE — PLAN OF CARE
Problem: Patient Care Overview (Adult)  Goal: Plan of Care Review  Outcome: Ongoing (interventions implemented as appropriate)  Goal: Adult Individualization and Mutuality  Outcome: Ongoing (interventions implemented as appropriate)  Goal: Discharge Needs Assessment  Outcome: Ongoing (interventions implemented as appropriate)    Problem: Fall Risk (Adult)  Goal: Identify Related Risk Factors and Signs and Symptoms  Outcome: Ongoing (interventions implemented as appropriate)  Goal: Absence of Falls  Outcome: Ongoing (interventions implemented as appropriate)    Problem: Infection, Risk/Actual (Adult)  Goal: Identify Related Risk Factors and Signs and Symptoms  Outcome: Ongoing (interventions implemented as appropriate)  Goal: Infection Prevention/Resolution  Outcome: Ongoing (interventions implemented as appropriate)    Problem: Cellulitis (Adult)  Goal: Signs and Symptoms of Listed Potential Problems Will be Absent or Manageable (Cellulitis)  Outcome: Ongoing (interventions implemented as appropriate)

## 2017-01-19 NOTE — PROGRESS NOTES
Discharge Planning Assessment  [facility]     Patient Name: Haim Snell  MRN: [id number]  Today's Date: 1/19/2017    Admit Date: 1/18/2017          Discharge Needs Assessment       01/19/17 1526    Living Environment    Lives With --   Pt lives with significant other, Jasmyne Herrera.     Quality Of Family Relationships supportive    Able to Return to Prior Living Arrangements yes    Discharge Needs Assessment    Equipment Currently Used at Home walker, rolling   Pt informed SS that he has a rolling walker and straight cane from [facility].     Equipment Needed After Discharge none    Discharge Facility/Level Of Care Needs --   Pt does not utilize home health services and denies need for services at this time.     Transportation Available family or friend will provide   Sign. other to provide transportation at discharge.    Discharge Disposition home or self-care            Discharge Plan       01/19/17 1528    Case Management/Social Work Plan    Plan Pt lives at home with sign. other and plans to return home at discharge. Pt does not utilize home health services. Pt has a straight cane and rolling walker. SS to follow and assist as needed with discharge planning.     Patient/Family In Agreement With Plan yes                  Demographic Summary       01/19/17 1517    Referral Information    Referral Source nursing    Reason For Consult --   SS received consult d/c planning; cellulitis LLE. SS spoke to pt.     Primary Care Physician Information    Name Dr. Sanon                Legal       01/19/17 1526    Legal    Legal Comments Pt does not have a POA or advance directive.           Josefa Hatfield

## 2017-01-19 NOTE — OP NOTE
INCISION AND DRAINAGE LOWER EXTREMITY  Procedure Note    Haim Snell  1/18/2017 - 1/19/2017    Pre-op Diagnosis:   Cellulitis of left lower extremity [L03.116]    Post-op Diagnosis:   same    Indications: see above    Procedure(s):  INCISION AND DRAINAGE ABSCESS LEFT ANKLE    Surgeon(s):  Manpreet Garcia MD    Anesthesia: Choice    Staff:   Circulator: Buck Crook RN  Scrub Person: Kirsty Pa  Assistant: Chris Ash    Findings: No evidence of abscess at site of ulceration.  No necrotic tissue identified.    Operative Procedure: Patient was doing operating suite and placed supine on operating table.  Bilateral sequential compression devices were applied and MAC anesthesia was administered.  The left lower extremity was prepped and draped in usual sterile fashion.  Timeout procedure was performed. On the lateral ankle there was an area of ulceration with cellulitis extending to the foot and mid calf.  At this area of ulceration a stab incision was made and carried down through the area of ulceration.  There is no evidence of purulence and there is only significant edema without necrotic tissue.  No tracts were identified and the underlying muscle and fascia were within normal limits.  At this time the incision was packed with gauze after irrigation and dressed with a wrap.    Estimated Blood Loss: 5 mL    Specimens: none                   Drains: none    Grafts or Implants: packing left lower extremity    Complications: none    Manpreet Garcia MD     Date: 1/19/2017  Time: 1:14 PM

## 2017-01-19 NOTE — PLAN OF CARE
Problem: Patient Care Overview (Adult)  Goal: Discharge Needs Assessment  Outcome: Ongoing (interventions implemented as appropriate)  Discharge Planning Assessment   [place]     Patient Name: Haim Snell                    MRN: [id number]  Today's Date: 1/19/2017                     Admit Date: 1/18/2017             Discharge Needs Assessment        01/19/17 1526     Living Environment     Lives With --   Pt lives with significant other, Jasmyne Herrera.      Quality Of Family Relationships supportive     Able to Return to Prior Living Arrangements yes     Discharge Needs Assessment     Equipment Currently Used at Home walker, rolling   Pt informed SS that he has a rolling walker and straight cane from [facility].      Equipment Needed After Discharge none     Discharge Facility/Level Of Care Needs --   Pt does not utilize home health services and denies need for services at this time.      Transportation Available family or friend will provide   Sign. other to provide transportation at discharge.     [facility] Dis[facility] or [facility]       Discharge Plan        01/19/17 1528     Case Management/Social Work Plan     Plan Pt lives at home with sign. other and plans to return home at discharge. Pt does not utilize home health services. Pt has a straight cane and rolling walker. SS to follow and assist as needed with discharge planning.      Patient/Family In Agreement With Plan yes         Discharge Placement      No information found                     Demographic Summary        01/19/17 1517     Referral Information     Referral Source nursing     Reason For Consult --   SS received consult d/c planning; cellulitis LLE. SS spoke to pt.      Primary Care Physician Information     Name Dr. Sanon        [email or web address]       Functional Status      None       [email or web address]       Psychosocial      None       mkbmk       Abuse/Neglect      None       mkbmk       Legal        01/19/17 1526     Legal      Legal Comments Pt does not have a POA or advance directive.        mkbmk       Substance Abuse      None       mkbmk       Patient Forms      None       kristine Hatfield

## 2017-01-19 NOTE — CONSULTS
Nutrition Services    Patient Name:  Haim Snell  YOB: 1960  MRN: [id number]  Admit Date:  1/18/2017        Rec: adding MVI daily to aid in healing.  Po intakes not yet established.  RD will follow.      Electronically signed by:  Marcelina Palacios RD  01/19/17 2:30 PM

## 2017-01-19 NOTE — PROGRESS NOTES
Haim Snell 56 y.o.   01/19/17    Subjective: Patient admitted with cellulitis of the left ankle with palpable [place].  Patient states he took his pocketknife to puncture at and now has marked erythema and warmth in the ankle  Objective: Vital signs stable afebrile.  Marked erythema and swelling of the left ankle with palpable of partially drained boil  Vital Signs (last 72 hrs)       01/15 0700  -  01/16 0659 01/16 0700  -  01/17 0659 01/17 0700  -  01/18 0659 01/18 0700  -  01/19 0659   Most Recent    Temp (°F)       97.5 -  98.2     98.2 (36.8)    Heart Rate       98 -  120     120    Resp       16 - 18     18    BP       132/72 -  155/99     136/78    SpO2 (%)       (!)81 -  98     95        Lab Results (last 72 hours)     Procedure Component Value Units Date/Time    CBC & Differential [[id number]] Collected:  01/18/17 1000    Specimen:  Blood Updated:  01/18/17 1010    Narrative:       The following orders were created for panel order CBC & Differential.  Procedure                               Abnormality         Status                     ---------                               -----------         ------                     CBC Auto Differential[[id number]]         Abnormal            Final result                 Please view results for these tests on the individual orders.    CBC Auto Differential [[id number]]  (Abnormal) Collected:  01/18/17 1000    Specimen:  Blood Updated:  01/18/17 1010     WBC 4.65 10*3/mm3      RBC 4.68 (L) 10*6/mm3      Hemoglobin 12.8 (L) g/dL      Hematocrit 41.7 (L) %      MCV 89.1 fL      MCH 27.4 pg      MCHC 30.7 (L) g/dL      RDW 18.0 (H) %      RDW-SD 56.6 (H) fl      MPV 9.8 fL      Platelets 193 10*3/mm3      Neutrophil % 54.2 %      Lymphocyte % 35.1 %      Monocyte % 9.0 %      Eosinophil % 1.5 %      Basophil % 0.2 %      Immature Grans % 0.0 %      Neutrophils, Absolute 2.52 10*3/mm3      Lymphocytes, Absolute 1.63 10*3/mm3      Monocytes, Absolute 0.42 10*3/mm3       Eosinophils, Absolute 0.07 10*3/mm3      Basophils, Absolute 0.01 10*3/mm3      Immature Grans, Absolute 0.00 10*3/mm3     Lactic Acid, Plasma [[id number]]  (Abnormal) Collected:  01/18/17 1000    Specimen:  Blood Updated:  01/18/17 1025     Lactate 3.0 (C) mmol/L     C-reactive Protein [[id number]]  (Normal) Collected:  01/18/17 1000    Specimen:  Blood Updated:  01/18/17 1026     C-Reactive Protein 0.91 mg/dL     Uric Acid [[id number]]  (Normal) Collected:  01/18/17 1000    Specimen:  Blood Updated:  01/18/17 1027     Uric Acid 4.0 mg/dL     Comprehensive Metabolic Panel [[id number]]  (Abnormal) Collected:  01/18/17 1000    Specimen:  Blood Updated:  01/18/17 1028     Glucose 353 (H) mg/dL      BUN 6 (L) mg/dL      Creatinine 0.71 mg/dL      Sodium 145 mmol/L      Potassium 3.5 mmol/L      Chloride 102 mmol/L      CO2 32.8 (H) mmol/L      Calcium 8.7 mg/dL      Total Protein 8.2 (H) g/dL      Albumin 3.80 g/dL      ALT (SGPT) 24 U/L      AST (SGOT) 37 (H) U/L      Alkaline Phosphatase 248 (H) U/L      Total Bilirubin 0.3 mg/dL      eGFR Non African Amer 114 mL/min/1.73      Globulin 4.4 gm/dL      A/G Ratio 0.9 (L) g/dL      BUN/Creatinine Ratio 8.5      Anion Gap 10.2 mmol/L     Osmolality, Calculated [[id number]]  (Normal) Collected:  01/18/17 1000    Specimen:  Blood Updated:  01/18/17 1028     Osmolality Calc 300.5 mOsm/kg     Sedimentation Rate [[id number]]  (Abnormal) Collected:  01/18/17 1000    Specimen:  Blood Updated:  01/18/17 1028     Sed Rate 29 (H) mm/hr     Urinalysis With / Culture If Indicated [[id number]]  (Abnormal) Collected:  01/18/17 1141    Specimen:  Urine from Urine, Clean Catch Updated:  01/18/17 1147     Color, UA Yellow      Appearance, UA Clear      pH, UA 7.0      Specific Gravity, UA 1.016      Glucose, UA >=1000 mg/dL (3+) (A)      Ketones, UA Negative      Bilirubin, UA Negative      Blood, UA Negative      Protein, UA Negative      Leuk Esterase, UA Negative      Nitrite, UA Negative       Urobilinogen, UA 1.0 E.U./dL     Narrative:       Urine microscopic not indicated.    Light Blue Top [[id number]] Collected:  01/18/17 1000    Specimen:  Blood Updated:  01/18/17 1401     Extra Tube hold for add-on       Auto resulted       Lavender Top [[id number]] Collected:  01/18/17 1000    Specimen:  Blood Updated:  01/18/17 1401     Extra Tube hold for add-on       Auto resulted       [place] Draw [[id number]] Collected:  01/18/17 1000    Specimen:  Blood Updated:  01/18/17 1401    Narrative:       The following orders were created for panel order [place] Draw.  Procedure                               Abnormality         Status                     ---------                               -----------         ------                     Light Blue Top[[id number]]                                    Final result               Green Top (Gel)[[id number]]                                   Final result               Lavender Top[[id number]]                                      Final result               Gold Top - SST[[id number]]                                    Final result                 Please view results for these tests on the individual orders.    Green Top (Gel) [[id number]] Collected:  01/18/17 1000    Specimen:  Blood Updated:  01/18/17 1401     Extra Tube Hold for add-ons.       Auto resulted.       Gold Top - SST [[id number]] Collected:  01/18/17 1000    Specimen:  Blood Updated:  01/18/17 1401     Extra Tube Hold for add-ons.       Auto resulted.       Lactate Acid, Reflex [[id number]]  (Abnormal) Collected:  01/18/17 1408    Specimen:  Blood Updated:  01/18/17 1510     Lactate 2.7 (C) mmol/L     POC Glucose Fingerstick [[id number]]  (Abnormal) Collected:  01/18/17 1654    Specimen:  Blood Updated:  01/18/17 1658     Glucose 206 (H) mg/dL     Narrative:       Meter: [id number] : [id number] mayne mary    POC Glucose Fingerstick [[id number]]  (Abnormal) Collected:  01/18/17 2006    Specimen:  Blood Updated:  01/18/17 2010      Glucose 296 (H) mg/dL     Narrative:       Meter: [id number] : [id number] flip apodaca    Blood Culture [[id number]]  (Normal) Collected:  01/18/17 1000    Specimen:  Blood from Arm, Right Updated:  01/18/17 2301     Blood Culture No growth at less than 24 hours     Blood Culture [[id number]]  (Normal) Collected:  01/18/17 1038    Specimen:  Blood from Arm, Left Updated:  01/18/17 2301     Blood Culture No growth at less than 24 hours         Imaging Results (last 24 hours)     Procedure Component Value Units Date/Time    XR Foot 3+ View Left [[id number]] Collected:  01/18/17 1057     Updated:  01/18/17 1100    Narrative:       XR FOOT 3+ VW LEFT-     REASON FOR EXAM:  pain/swelling     The bony structures are intact. There were no acute fractures,  dislocations or bony destructive changes seen. No periosteal reaction is  noted. There were no radiopaque foreign bodies identified in the soft  tissues.       Impression:       Negative foot     This report was finalized on 1/18/2017 10:58 AM by Dr. Erik Dunn II, MD.       XR Ankle 3+ View Left [[id number]] Collected:  01/18/17 1105     Updated:  01/18/17 1108    Narrative:       XR ANKLE 3+ VIEW LEFT-     REASON FOR EXAM:  Pain/swelling.     FINDINGS: The bony structures of the ankle show no evidence of fracture  or dislocation. There is evidence of diffuse soft tissue swelling. There  were no foreign bodies in the soft tissues.       Impression:       Soft tissue swelling around the left ankle.     This report was finalized on 1/18/2017 11:06 AM by Dr. Erik Dunn II, MD.           Assessment:Active Problems:    Cellulitis of left lower extremity     Plan: Consult surgery continue IV antibiotics

## 2017-01-19 NOTE — ANESTHESIA POSTPROCEDURE EVALUATION
Patient: Haim Snell    Procedure Summary     Date Anesthesia Start Anesthesia Stop Room / Location    01/19/17 1236 1256  [facility] 02 / [facility]       Procedure Diagnosis Surgeon Provider    INCISION AND DRAINAGE ABSCESS LEFT ANKLE (Left ) Cellulitis of left lower extremity  (Cellulitis of left lower extremity [L03.116]) MD Don Guerrero MD          Anesthesia Type: general  Last vitals  /93 (01/19/17 1355)    Temp 97.9 °F (36.6 °C) (01/19/17 1355)    Pulse 101 (01/19/17 1355)   Resp 18 (01/19/17 1355)    SpO2 95 % (01/19/17 1355)      Post Anesthesia Care and Evaluation    Patient location during evaluation: PHASE II  Patient participation: complete - patient participated  Level of consciousness: awake and alert  Pain score: 0  Pain management: adequate  Airway patency: patent  Anesthetic complications: No anesthetic complications    Cardiovascular status: acceptable  Respiratory status: acceptable  Hydration status: acceptable

## 2017-01-19 NOTE — H&P
ADMITTING DIAGNOSIS:  Abscess cellulitis of the left lower extremity.     HISTORY OF PRESENT ILLNESS: The patient is a 56-year-old that developed a boil over his left lower ankle joint area and picked at it with his knife and now has cellulitis above his ankle and into his foot. It is very tender to palpate and very warm. He denies any fever, chills. He was admitted and placed on Zosyn and vancomycin.     MEDICATIONS:  1.  Tramadol p.r.n.   2.  Spironolactone.    3.  Protonix.    4.  Glucotrol 5.    5.  Gabapentin.    6.  Alprazolam.     PAST MEDICAL HISTORY:  1.  Has history of alcoholic hepatitis and dependency.   2.  Hypertension.   3.  Major depression.    4.  He has also had bouts of hepatic encephalopathy.      PAST SURGICAL HISTORY:  Prior knee surgery without complications.      PHYSICAL EXAMINATION:  GENERAL:  He is alert, oriented, in no acute distress.   NECK: Supple.   LUNGS: Clear.   HEART: S1 and S2.    EXTREMITIES:  Left lower extremity marked erythema, warm, tender to palpation with a raised  partially drained abscess of his ankle.     PLAN: Continue the IV antibiotics, and obtain surgical consultation.        D: LUNA 01/19/2017 11:28:30 ET  T: dereck / 01/19/2017 13:27:29 ET  Revision Count: 0  Job ID: [id number]  [id number]  cc:        Dictator Signature:___________________________     Edgar Sanon MD

## 2017-01-19 NOTE — PAYOR COMM NOTE
"Contact: Amy Pimentel RN @ [facility]  Phone: [phone]  Fax: [phone]    Inpatient status  Clinical               Haim Parker (56 y.o. Male)     Date of Birth Social Security Number Address Home Phone MRN    1960  [address] [place] [address] [phone] [id number]    [employer] Marital Status          Unknown        Admission Date Admission Type Admitting Provider Attending Provider Department, Room/Bed    1/18/17 Emergency Edgar Sanon MD Watts, John Michael, MD [address], 3326/1P    Discharge Date Discharge Disposition Discharge Destination                      Attending Provider: Edgar Sanon MD     Allergies:  Sulfa Antibiotics    Isolation:  None   Infection:  None   Code Status:  Prior    Ht:  70\" (177.8 cm)   Wt:  195 lb (88.5 kg)    Admission Cmt:  None   Principal Problem:  None                Active Insurance as of 1/18/2017     Primary Coverage     Payor Plan Insurance Group Employer/Plan Group    [employer] [place] [employer] [place]      Payor Plan Address Payor Plan Phone Number Effective From Effective To    [address] [address] [phone] 8/18/2016     [place] [address]       Subscriber Name Subscriber Birth Date Member ID       HAIM PARKER [id number]                 Emergency Contacts      (Rel.) Home Phone Work Phone Mobile Phone    Belen Parker (Other) [phone] -- --            History & Physical     No notes of this type exist for this encounter.           Emergency Department Notes      Malorie Friedman RN at 1/18/2017  9:06 AM          Pt reports that he has been having pain in his left foot x 1 week.  Reports that he noticed a bump to the area, sterilized a razor blade and cut it open.  Redness, tenderness, erythema, warmth noted to area.  Pt reports that he is having severe pain to his left foot and feels like it is in his bone.  Pt family at bedside.     Malorie Friedman, " RN  01/18/17 1018       Electronically signed by Malorie Friedman RN at 1/18/2017 10:18 AM      Berry Lambert PA-C at 1/18/2017  9:36 AM          Subjective   Patient is a 56 y.o. male presenting with lower extremity pain.   History provided by:  Patient   used: No    Lower Extremity Issue   Location:  Ankle and foot  Time since incident:  1 week  Injury: no    Ankle location:  L ankle  Foot location:  L foot  Pain details:     Severity:  Moderate    Duration:  2 weeks    Timing:  Constant    Progression:  Worsening  Chronicity:  New  Dislocation: no    Foreign body present:  No foreign bodies  Prior injury to area:  No  Worsened by:  Nothing  Ineffective treatments:  None tried  Associated symptoms: stiffness and swelling    Associated symptoms: no back pain and no decreased ROM    Risk factors: no concern for non-accidental trauma, no frequent fractures and no obesity        Review of Systems   Musculoskeletal: Positive for stiffness. Negative for back pain.   Skin: Positive for rash and wound. other systems reviewed and are negative.      Past Medical History   Diagnosis Date   • Alcohol abuse    • Anxiety    • Depression    • Diabetes    • GERD (gastroesophageal reflux disease)    • Hepatic encephalopathy    • Hypertension    • Liver disease    • Neuropathic pain    • Withdrawal symptoms, alcohol        Allergies   Allergen Reactions   • Sulfa Antibiotics        Past Surgical History   Procedure Laterality Date   • Knee surgery Right        Family History   Problem Relation Age of Onset   • Family history unknown: Yes       Social History     Social History   • Marital status:      Spouse name: N/A   • Number of children: N/A   • Years of education: N/A     Social History Main Topics   • Smoking status: Current Every Day Smoker     Packs/day: 1.00     Years: 40.00     Types: Cigarettes   • Smokeless tobacco: Never Used      Comment: declines   • Alcohol use Yes       Comment: patient says he started drinking at age 11. drank heavy for several years starting about 45 years ago. has been drinking more which is now  up to a  5th a day this time for about 7 months.    • Drug use: No      Comment: denies   • Sexual activity: Yes     Partners: Female     Birth control/ protection: None     Other Topics Concern   • None     Social History Narrative           Objective   Physical Exam   Constitutional: He is oriented to person, place, and time. He appears well-developed and well-nourished.   HENT:   Head: Normocephalic.   Right Ear: External ear normal.   Left Ear: External ear normal.   Nose: Nose normal.   Mouth/Throat: Oropharynx is clear and moist.   Eyes: Conjunctivae and EOM are normal. Pupils are equal, round, and reactive to light.   Neck: Normal range of motion. Neck supple. No thyromegaly present.   Cardiovascular: Normal rate, regular rhythm, normal heart sounds and intact distal pulses.    Pulmonary/Chest: Effort normal and breath sounds normal.   Abdominal: Soft. Bowel sounds are normal.   Lymphadenopathy:     He has no cervical adenopathy.   Neurological: He is alert and oriented to person, place, and time. He has normal reflexes.   Skin: Skin is warm and dry. Bruising, ecchymosis and rash noted.        Small blisters to left lateral malleolus.  Approximately 2 cm scaling plaque.  Moderate erythematous rash extending from left foot approximately to mid calf.   Psychiatric: He has a normal mood and affect. His behavior is normal. Judgment and thought content normal. note and vitals reviewed.      Procedures        ED Course  ED Course   Comment By Time   This is a 56-year-old male comes in with chief complaint left lower extremity pain, swelling, redness.  Patient does report that approximately one week ago he noticed some small blisters on the lower part of his leg that he tried to pop.  He states that approximately 2 days after this he developed apparent secondary  infection.  Patient denies any fever, chills.  Patient is a known diabetic. Berry Lambert PA-C 01/18 0920   Discuss care with Dr. Sanon regarding patient's left lower extremity cellulitis.  Agrees that patient should be admitted with IV antibiotics.  Berry Lambert PA-C 01/18 1236                  MDM  Number of Diagnoses or Management Options  Cellulitis of left lower extremity: new and requires workup     Amount and/or Complexity of Data Reviewed  Clinical lab tests: ordered and reviewed    Risk of Complications, Morbidity, and/or Mortality  Presenting problems: moderate  Diagnostic procedures: moderate  Management options: moderate    Patient Progress  Patient progress: stable      Final diagnoses:   Cellulitis of left lower extremity           Berry Lambert PA-C  01/18/17 1240       Electronically signed by Berry Lambert PA-C at 1/18/2017 12:40 PM      Malorie Friedman RN at 1/18/2017 10:00 AM          Pt resting quietly on stretcher with continuing complaint of pain.  Pt AAOx4 with no resp distress noted.  Pt denies any needs at this time.  Skin PWD.  Pt family at bedside. Will continue to monitor and follow plan of care.     Malorie Friedman RN  01/18/17 1124       Electronically signed by Malorie Friedman RN at 1/18/2017 11:24 AM      Malorie Friedman RN at 1/18/2017 11:00 AM          Pt resting quietly on stretcher with continuing complaint of pain.  Pt AAOx4 with no resp distress noted.  Pt denies any needs at this time.  Skin PWD.  Pt family at bedside. Will continue to monitor and follow plan of care.     Malorie Friedman RN  01/18/17 1124       Electronically signed by Malorie Friedman RN at 1/18/2017 11:24 AM      Malorie Friedman RN at 1/18/2017 12:00 PM          Pt resting quietly on stretcher with complaint of pain.  Pt AAOx4 with no resp distress noted.  Pt denies any needs at this time.  Skin PWD.  Pt family at bedside. Will  continue to monitor and follow plan of care.     Malorie Friedman RN  01/18/17 1302       Electronically signed by Malorie Friedman RN at 1/18/2017  1:02 PM      Malorie Friedman RN at 1/18/2017  1:00 PM          Pt resting quietly on stretcher with complaint of pain.  Pt AAOx4 with no resp distress noted.  Pt denies any needs at this time.  Skin PWD.  Pt family at bedside. Will continue to monitor and follow plan of care.     Malorie Friedman RN  01/18/17 1302       Electronically signed by Malorie Friedman RN at 1/18/2017  1:02 PM           Physician Progress Notes (last 72 hours) (Notes from 1/16/2017 10:07 AM through 1/19/2017 10:07 AM)      Edgar Sanon MD at 1/19/2017  6:59 AM  Version 1 of 1         Haim Snell 56 y.o.   01/19/17    Subjective: Patient admitted with cellulitis of the left ankle with palpable [place].  Patient states he took his pocketknife to puncture at and now has marked erythema and warmth in the ankle  Objective: Vital signs stable afebrile.  Marked erythema and swelling of the left ankle with palpable of partially drained boil  Vital Signs (last 72 hrs)       01/15 0700  -  01/16 0659 01/16 0700  -  01/17 0659 01/17 0700  -  01/18 0659 01/18 0700  -  01/19 0659   Most Recent    Temp (°F)       97.5 -  98.2     98.2 (36.8)    Heart Rate       98 -  120     120    Resp       16 - 18 18    BP       132/72 -  155/99     136/78    SpO2 (%)       (!)81 -  98     95        Lab Results (last 72 hours)     Procedure Component Value Units Date/Time    CBC & Differential [[id number]] Collected:  01/18/17 1000    Specimen:  Blood Updated:  01/18/17 1010    Narrative:       The following orders were created for panel order CBC & Differential.  Procedure                               Abnormality         Status                     ---------                               -----------         ------                     CBC Auto Differential[[id number]]          Abnormal            Final result                 Please view results for these tests on the individual orders.    CBC Auto Differential [[id number]]  (Abnormal) Collected:  01/18/17 1000    Specimen:  Blood Updated:  01/18/17 1010     WBC 4.65 10*3/mm3      RBC 4.68 (L) 10*6/mm3      Hemoglobin 12.8 (L) g/dL      Hematocrit 41.7 (L) %      MCV 89.1 fL      MCH 27.4 pg      MCHC 30.7 (L) g/dL      RDW 18.0 (H) %      RDW-SD 56.6 (H) fl      MPV 9.8 fL      Platelets 193 10*3/mm3      Neutrophil % 54.2 %      Lymphocyte % 35.1 %      Monocyte % 9.0 %      Eosinophil % 1.5 %      Basophil % 0.2 %      Immature Grans % 0.0 %      Neutrophils, Absolute 2.52 10*3/mm3      Lymphocytes, Absolute 1.63 10*3/mm3      Monocytes, Absolute 0.42 10*3/mm3      Eosinophils, Absolute 0.07 10*3/mm3      Basophils, Absolute 0.01 10*3/mm3      Immature Grans, Absolute 0.00 10*3/mm3     Lactic Acid, Plasma [[id number]]  (Abnormal) Collected:  01/18/17 1000    Specimen:  Blood Updated:  01/18/17 1025     Lactate 3.0 (C) mmol/L     C-reactive Protein [[id number]]  (Normal) Collected:  01/18/17 1000    Specimen:  Blood Updated:  01/18/17 1026     C-Reactive Protein 0.91 mg/dL     Uric Acid [[id number]]  (Normal) Collected:  01/18/17 1000    Specimen:  Blood Updated:  01/18/17 1027     Uric Acid 4.0 mg/dL     Comprehensive Metabolic Panel [[id number]]  (Abnormal) Collected:  01/18/17 1000    Specimen:  Blood Updated:  01/18/17 1028     Glucose 353 (H) mg/dL      BUN 6 (L) mg/dL      Creatinine 0.71 mg/dL      Sodium 145 mmol/L      Potassium 3.5 mmol/L      Chloride 102 mmol/L      CO2 32.8 (H) mmol/L      Calcium 8.7 mg/dL      Total Protein 8.2 (H) g/dL      Albumin 3.80 g/dL      ALT (SGPT) 24 U/L      AST (SGOT) 37 (H) U/L      Alkaline Phosphatase 248 (H) U/L      Total Bilirubin 0.3 mg/dL      eGFR Non African Amer 114 mL/min/1.73      Globulin 4.4 gm/dL      A/G Ratio 0.9 (L) g/dL      BUN/Creatinine Ratio 8.5      Anion Gap 10.2 mmol/L      Osmolality, Calculated [[id number]]  (Normal) Collected:  01/18/17 1000    Specimen:  Blood Updated:  01/18/17 1028     Osmolality Calc 300.5 mOsm/kg     Sedimentation Rate [[id number]]  (Abnormal) Collected:  01/18/17 1000    Specimen:  Blood Updated:  01/18/17 1028     Sed Rate 29 (H) mm/hr     Urinalysis With / Culture If Indicated [[id number]]  (Abnormal) Collected:  01/18/17 1141    Specimen:  Urine from Urine, Clean Catch Updated:  01/18/17 1147     Color, UA Yellow      Appearance, UA Clear      pH, UA 7.0      Specific Gravity, UA 1.016      Glucose, UA >=1000 mg/dL (3+) (A)      Ketones, UA Negative      Bilirubin, UA Negative      Blood, UA Negative      Protein, UA Negative      Leuk Esterase, UA Negative      Nitrite, UA Negative      Urobilinogen, UA 1.0 E.U./dL     Narrative:       Urine microscopic not indicated.    Light Blue Top [[id number]] Collected:  01/18/17 1000    Specimen:  Blood Updated:  01/18/17 1401     Extra Tube hold for add-on       Auto resulted       Lavender Top [[id number]] Collected:  01/18/17 1000    Specimen:  Blood Updated:  01/18/17 1401     Extra Tube hold for add-on       Auto resulted       [place] Draw [[id number]] Collected:  01/18/17 1000    Specimen:  Blood Updated:  01/18/17 1401    Narrative:       The following orders were created for panel order [place] Draw.  Procedure                               Abnormality         Status                     ---------                               -----------         ------                     Light Blue Top[[id number]]                                    Final result               Green Top (Gel)[[id number]]                                   Final result               Lavender Top[[id number]]                                      Final result               Gold Top - SST[[id number]]                                    Final result                 Please view results for these tests on the individual orders.    Green Top (Gel) [[id number]] Collected:   01/18/17 1000    Specimen:  Blood Updated:  01/18/17 1401     Extra Tube Hold for add-ons.       Auto resulted.       Gold Top - SST [[id number]] Collected:  01/18/17 1000    Specimen:  Blood Updated:  01/18/17 1401     Extra Tube Hold for add-ons.       Auto resulted.       Lactate Acid, Reflex [[id number]]  (Abnormal) Collected:  01/18/17 1408    Specimen:  Blood Updated:  01/18/17 1510     Lactate 2.7 (C) mmol/L     POC Glucose Fingerstick [[id number]]  (Abnormal) Collected:  01/18/17 1654    Specimen:  Blood Updated:  01/18/17 1658     Glucose 206 (H) mg/dL     Narrative:       Meter: [id number] : [id number] mayne mary    POC Glucose Fingerstick [[id number]]  (Abnormal) Collected:  01/18/17 2006    Specimen:  Blood Updated:  01/18/17 2010     Glucose 296 (H) mg/dL     Narrative:       Meter: [id number] : [id number] flip apodaca    Blood Culture [[id number]]  (Normal) Collected:  01/18/17 1000    Specimen:  Blood from Arm, Right Updated:  01/18/17 2301     Blood Culture No growth at less than 24 hours     Blood Culture [[id number]]  (Normal) Collected:  01/18/17 1038    Specimen:  Blood from Arm, Left Updated:  01/18/17 2301     Blood Culture No growth at less than 24 hours         Imaging Results (last 24 hours)     Procedure Component Value Units Date/Time    XR Foot 3+ View Left [[id number]] Collected:  01/18/17 1057     Updated:  01/18/17 1100    Narrative:       XR FOOT 3+ VW LEFT-     REASON FOR EXAM:  pain/swelling     The bony structures are intact. There were no acute fractures,  dislocations or bony destructive changes seen. No periosteal reaction is  noted. There were no radiopaque foreign bodies identified in the soft  tissues.       Impression:       Negative foot     This report was finalized on 1/18/2017 10:58 AM by Dr. Erik Dunn II, MD.       XR Ankle 3+ View Left [[id number]] Collected:  01/18/17 1105     Updated:  01/18/17 1108    Narrative:       XR ANKLE 3+ VIEW LEFT-     REASON FOR EXAM:   Pain/swelling.     FINDINGS: The bony structures of the ankle show no evidence of fracture  or dislocation. There is evidence of diffuse soft tissue swelling. There  were no foreign bodies in the soft tissues.       Impression:       Soft tissue swelling around the left ankle.     This report was finalized on 1/18/2017 11:06 AM by Dr. Erik Dunn II, MD.           Assessment:Active Problems:    Cellulitis of left lower extremity     Plan: Consult surgery continue IV antibiotics     Electronically signed by Edgar Sanon MD at 1/19/2017  7:00 AM        All medication doses during the admission are shown, including meds that are no longer on order.     Scheduled Meds Sorted by Name for Haim Snell as of 01/19/17 1007       1 Day 3 Days 7 Days 10 Days < Today >    Legend:                           Inactive     Active     Other Encounter    Linked               Medications 01/10 01/11 01/12 01/13 01/14 01/15 01/16 01/17 01/18 01/19   citalopram (CeleXA) tablet 20 mg  Dose: 20 mg Freq: Daily Route: PO  Start: 01/19/17 0900    Admin Instructions:   Caution: Look alike/sound alike drug alert.             0815          citalopram (CeleXA) tablet 20 mg  Dose: 20 mg Freq: Daily Route: PO  Start: 09/24/16 1800 End: 09/29/16 1630    Admin Instructions:   Caution: Look alike/sound alike drug alert.                DULoxetine (CYMBALTA) DR capsule 60 mg  Dose: 60 mg Freq: Daily Route: PO  Start: 01/19/17 0900    Admin Instructions:   Do not crush or chew capsule.             0816          DULoxetine (CYMBALTA) DR capsule 60 mg  Dose: 60 mg Freq: Daily Route: PO  Start: 09/24/16 1800 End: 09/29/16 1630    Admin Instructions:   Do not crush or chew capsule.                gabapentin (NEURONTIN) capsule 800 mg  Dose: 800 mg Freq: Every 6 Hours Scheduled Route: PO  Start: 01/18/17 1800            1824 6623 0201       1200       1800          gabapentin (NEURONTIN) capsule 800 mg  Dose: 800 mg Freq: Every 8  Hours Scheduled Route: PO  Start: 09/24/16 1645 End: 09/29/16 1630                glipiZIDE (GLUCOTROL) tablet 5 mg  Dose: 5 mg Freq: 2 Times Daily Before Meals Route: PO  Start: 01/18/17 1800            1823        0816       1730          glipiZIDE (GLUCOTROL) tablet 5 mg  Dose: 5 mg Freq: 2 Times Daily Before Meals Route: PO  Start: 09/24/16 1730 End: 09/29/16 1630                HYDROmorphone (DILAUDID) injection 1 mg  Dose: 1 mg Freq: Once Route: IV  Start: 01/18/17 1117 End: 01/18/17 1121            1121           insulin aspart (novoLOG) injection 0-7 Units  Dose: 0-7 Units Freq: 4 Times Daily Before Meals & Nightly Route: SC  Start: 01/18/17 1730    Admin Instructions:   Correction - Low Dose.  Less than 40 units/day total insulin dose or lean, elderly, renal patients    Blood glucose 150-199 mg/dL - 2 units  Blood glucose 200-249 mg/dL - 3 units  Blood glucose 250-299 mg/dL - 4 units  Blood glucose 300-349 mg/dL - 5 units  Blood glucose 350-400 mg/dL - 6 units  Blood glucose greater than 400 mg/dL - 7 units and call provider            1722       1856        0816       1130       1730       2100          LORazepam (ATIVAN) tablet 0.5 mg  Dose: 0.5 mg Freq: 3 times per day Route: PO  Start: 09/28/16 0000 End: 09/28/16 2115    Admin Instructions:   Lorazepam Detox Protocol - Day 4    Hold doses if patient is sedated.                LORazepam (ATIVAN) tablet 1 mg  Dose: 1 mg Freq: 3 times per day Route: PO  Start: 09/27/16 0000 End: 09/27/16 2106    Admin Instructions:   Lorazepam Detox Protocol - Day 3    Hold doses if patient is sedated.                LORazepam (ATIVAN) tablet 1.5 mg  Dose: 1.5 mg Freq: 3 times per day Route: PO  Start: 09/26/16 0000 End: 09/26/16 2105    Admin Instructions:   Lorazepam Detox Protocol - Day 2    Hold doses if patient is sedated.                LORazepam (ATIVAN) tablet 2 mg  Dose: 2 mg Freq: 3 times per day Route: PO  Start: 09/25/16 0000 End: 09/25/16 2100    Admin  Instructions:   Lorazepam Detox Protocol - Day 1    Hold doses if patient is sedated.                LORazepam (ATIVAN) tablet 2 mg  Dose: 2 mg Freq: Once Route: PO  Start: 09/23/16 1927 End: 09/23/16 1929                morphine injection 4 mg  Dose: 4 mg Freq: Once Route: IV  Start: 01/18/17 1024 End: 01/18/17 1042            1042           multiple vitamin (M.V.I. Adult) 10 mL, thiamine (B-1) 100 mg, folic acid 1 mg, magnesium sulfate 2 g in dextrose 5 % and sodium chloride 0.9 % 1,000 mL infusion  Dose: 1,000 mL/hr Freq: Once Route: IV  Start: 09/23/16 2110 End: 09/23/16 2111                multiple vitamin (M.V.I. Adult) 10 mL, thiamine (B-1) 100 mg, folic acid 1 mg, magnesium sulfate 2 g in dextrose 5 % and sodium chloride 0.9 % 1,000 mL infusion  Dose: 1,000 mL/hr Freq: Once Route: IV  Last Dose: Stopped (09/23/16 1307)  Start: 09/23/16 1115 End: 09/23/16 1307                multivitamin (DAILY JULIUS) tablet 1 tablet  Dose: 1 tablet Freq: Daily Route: PO  Start: 09/24/16 0900 End: 09/29/16 1630                nicotine (NICODERM CQ) 21 MG/24HR patch 1 patch  Dose: 1 patch Freq: Every 24 Hours Route: TD  Start: 09/24/16 0900 End: 09/29/16 1630    Admin Instructions:   For nicotine dependent patient                  ondansetron (ZOFRAN) injection 4 mg  Dose: 4 mg Freq: Once Route: IV  Start: 01/18/17 1033 End: 01/18/17 1040    Admin Instructions:   Maximum Dose 4 mg IV every 6 hours per pharmacy and therapeutics committee            1040           pantoprazole (PROTONIX) EC tablet 40 mg  Dose: 40 mg Freq: 2 Times Daily Before Meals Route: PO  Start: 01/18/17 1800    Admin Instructions:   Swallow whole; do not crush, split, or chew.            1823        7815       0109          pantoprazole (PROTONIX) EC tablet 40 mg  Dose: 40 mg Freq: 2 Times Daily Route: PO  Start: 09/24/16 1800 End: 09/29/16 1630    Admin Instructions:   Swallow whole; do not crush, split, or chew.                piperacillin-tazobactam  (ZOSYN) 3.375 g/100 mL 0.9% NS IVPB (mbp)  Dose: 3.375 g Freq: Every 6 Hours Route: IV  Indications of Use: SKIN AND SOFT TISSUE INFECTION  Start: 01/18/17 1800            1722       2318        0513       1200       1800          piperacillin-tazobactam (ZOSYN) 4.5 g/100 mL 0.9% NS IVPB (mbp)  Dose: 4.5 g Freq: Once Route: IV  Indications of Use: SKIN AND SOFT TISSUE INFECTION  Last Dose: Stopped (01/18/17 1232)  Start: 01/18/17 1229    Admin Instructions:   Refrigerate            1232           potassium chloride (K-DUR,KLOR-CON) ER tablet 10 mEq  Dose: 10 mEq Freq: Daily Route: PO  Start: 01/19/17 0900    Admin Instructions:   Swallow whole; do not crush, split, or chew.             0815          potassium chloride (K-DUR,KLOR-CON) ER tablet 10 mEq  Dose: 10 mEq Freq: Once Route: PO  Start: 09/24/16 2300 End: 09/24/16 2338    Admin Instructions:   Swallow whole; do not crush, split, or chew.                potassium chloride (K-DUR,KLOR-CON) ER tablet 10 mEq  Dose: 10 mEq Freq: Daily Route: PO  Start: 09/25/16 0900 End: 09/29/16 1630    Admin Instructions:   Swallow whole; do not crush, split, or chew.                potassium chloride (MICRO-K) CR capsule 10 mEq  Dose: 10 mEq Freq: Daily Route: PO  Start: 01/19/17 0900 End: 01/18/17 2300    Admin Instructions:   Do not crush. Take with food.            2300-D/C'd       potassium chloride (MICRO-K) CR capsule 10 mEq  Dose: 10 mEq Freq: Daily Route: PO  Start: 09/24/16 1800 End: 09/24/16 2147    Admin Instructions:   Do not crush. Take with food.                sodium chloride 0.9 % bolus 1,000 mL  Dose: 1,000 mL Freq: Once Route: IV  Last Dose: Stopped (01/18/17 1038)  Start: 01/18/17 0937 End: 01/18/17 1038            0950       1038           sodium chloride 0.9 % bolus 1,000 mL  Dose: 1,000 mL Freq: Once Route: IV  Start: 09/23/16 2109 End: 09/23/16 2333                sodium chloride 0.9 % bolus 1,000 mL  Dose: 1,000 mL Freq: Once Route: IV  Last Dose:  Stopped (09/23/16 1752)  Start: 09/23/16 1459 End: 09/23/16 1752                sodium chloride 0.9 % bolus 1,500 mL  Dose: 1,500 mL Freq: Once Route: IV  Last Dose: Stopped (01/18/17 1158)  Start: 01/18/17 1027 End: 01/18/17 1158            1039       1158           spironolactone (ALDACTONE) tablet 25 mg  Dose: 25 mg Freq: Daily Route: PO  Start: 01/19/17 0900             0815          spironolactone (ALDACTONE) tablet 25 mg  Dose: 25 mg Freq: Daily Route: PO  Start: 09/24/16 1800 End: 09/29/16 1630                vancomycin (VANCOCIN) 1 g in sodium chloride 0.9 % 250 mL IVPB  Dose: 1 g Freq: Once Route: IV  Indications of Use: SKIN AND SOFT TISSUE INFECTION  Last Dose: Stopped (01/18/17 1343)  Start: 01/18/17 1230 End: 01/19/17 0856            1343        0856-D/C'd      vancomycin (VANCOCIN) 1,500 mg in sodium chloride 0.9 % 500 mL IVPB  Dose: 1,500 mg Freq: Every 12 Hours Route: IV  Indications of Use: SKIN AND SOFT TISSUE INFECTION  Start: 01/18/17 2100 2006        0816       2100          vitamin B-1 tablet 100 mg  Dose: 100 mg Freq: Daily Route: PO  Start: 09/24/16 0900 End: 09/29/16 1630    Admin Instructions:   For patient's diagnosis that includes alcohol dependency.  Consult with physician for possible thiamine IM use.                Medications 01/10 01/11 01/12 01/13 01/14 01/15 01/16 01/17 01/18 01/19         Continuous Meds Sorted by Name for Haim Snell as of 01/19/17 1007      Legend:         Inactive     Active     Other Encounter    Linked               Medications 01/10 01/11 01/12 01/13 01/14 01/15 01/16 01/17 01/18 01/19   Pharmacy to dose vancomycin  Freq: Continuous PRN Route: XX  PRN Reason: consult  PRN Comment: pharmacy to dose vancomycin begining with second dose  Start: 01/18/17 1228 End: 01/18/17 1633            1633-D/C'd             PRN Meds Sorted by Name for Haim Snell as of 01/19/17 1007      Legend:         Inactive     Active     Other Encounter    Linked                Medications 01/10 01/11 01/12 01/13 01/14 01/15 01/16 01/17 01/18 01/19   acetaminophen (TYLENOL) tablet 650 mg  Dose: 650 mg Freq: Every 4 Hours PRN Route: PO  PRN Reasons: mild pain (1-3),moderate pain (4-6)  PRN Comment: severe pain (7-10)  Start: 09/24/16 0033 End: 09/29/16 1630    Admin Instructions:   Do not exceed 4 grams of acetaminophen in a 24 hr period.                ALPRAZolam (XANAX) tablet 1 mg  Dose: 1 mg Freq: 2 Times Daily PRN Route: PO  PRN Reason: anxiety  Start: 01/18/17 1800    Admin Instructions:   Avoid grapefruit juice                aluminum-magnesium hydroxide-simethicone (MAALOX/MYLANTA) suspension 30 mL  Dose: 30 mL Freq: Every 6 Hours PRN Route: PO  PRN Reason: heartburn  Start: 09/24/16 0033 End: 09/29/16 1630    Admin Instructions:   Maximum 120 mL in 24 hours.                dextrose (D50W) solution 25 g  Dose: 25 g Freq: Every 15 Minutes PRN Route: IV  PRN Reason: low blood sugar  PRN Comment: Blood Sugar Less Than 70, Patient Has IV Access - Unresponsive, NPO or Unable To Safely Swallow  Start: 01/18/17 1526                dextrose (GLUTOSE) oral gel 15 g  Dose: 15 g Freq: Every 15 Minutes PRN Route: PO  PRN Reason: low blood sugar  PRN Comment: Blood Sugar Less Than 70, Patient Alert, Is Not NPO & Can Safely Swallow  Start: 01/18/17 1526                glucagon (GLUCAGEN) injection 1 mg  Dose: 1 mg Freq: Every 15 Minutes PRN Route: SC  PRN Comment: Blood Glucose Less Than 70 - Patient Without IV Access - Unresponsive, NPO or Unable To Safely Swallow  Start: 01/18/17 1526                hydrOXYzine (ATARAX) tablet 50 mg  Dose: 50 mg Freq: Every 6 Hours PRN Route: PO  PRN Reason: anxiety  Start: 09/24/16 0033 End: 09/29/16 1630                ibuprofen (ADVIL,MOTRIN) tablet 400 mg  Dose: 400 mg Freq: Every 6 Hours PRN Route: PO  PRN Reason: mild pain (1-3)  Start: 09/24/16 0033 End: 09/29/16 1630    Admin Instructions:   Hold for aspirin or NSAID allergies (ie severe rash,  SOB, anaphylaxis).  Physician approval required for patients currently on lithium therapy.                loperamide (IMODIUM) capsule 2 mg  Dose: 2 mg Freq: 4 Times Daily PRN Route: PO  PRN Reason: diarrhea  Start: 09/24/16 0033 End: 09/29/16 1630    Admin Instructions:   Maximum recommended 16 mg / 24 hours.                  LORazepam (ATIVAN) tablet 0.5 mg  Dose: 0.5 mg Freq: Every 4 Hours PRN Route: PO  PRN Reason: withdrawal  PRN Comment: s/s of withdrawal per PAS criteria  Start: 09/28/16 0800 End: 09/29/16 0759    Admin Instructions:   Lorazepam Detox Protocol - Day 4    Hold doses if patient is sedated.                LORazepam (ATIVAN) tablet 1 mg  Dose: 1 mg Freq: Every 4 Hours PRN Route: PO  PRN Reason: withdrawal  PRN Comment: s/s of withdrawal per PAS criteria  Start: 09/27/16 0800 End: 09/28/16 0759    Admin Instructions:   Lorazepam Detox Protocol - Day 3    Hold doses if patient is sedated.                LORazepam (ATIVAN) tablet 1.5 mg  Dose: 1.5 mg Freq: Every 4 Hours PRN Route: PO  PRN Reason: withdrawal  PRN Comment: s/s of withdrawal per PAS criteria  Start: 09/26/16 0800 End: 09/27/16 0759    Admin Instructions:   Lorazepam Detox Protocol - Day 2    Hold doses if patient is sedated.                LORazepam (ATIVAN) tablet 2 mg  Dose: 2 mg Freq: Every 4 Hours PRN Route: PO  PRN Reason: withdrawal  PRN Comment: s/s of withdrawal per PAS criteria  Start: 09/25/16 0800 End: 09/26/16 0759    Admin Instructions:   Lorazepam Detox Protocol - Day 1    Hold doses if patient is sedated.                LORazepam (ATIVAN) tablet 2 mg  Dose: 2 mg Freq: Every 2 Hours PRN Route: PO  PRN Reason: withdrawal  PRN Comment: s/s of withdrawal per PAS criteria  Start: 09/24/16 0033 End: 09/25/16 0759    Admin Instructions:   Lorazepam Detox Protocol - Admission Day    Hold doses if patient is sedated.                magnesium hydroxide (MILK OF MAGNESIA) suspension 2400 mg/10mL 10 mL  Dose: 10 mL Freq: Daily PRN  Route: PO  PRN Reason: constipation  Start: 09/24/16 0033 End: 09/29/16 1630                Morphine sulfate (PF) injection 2 mg  Dose: 2 mg Freq: Every 3 Hours PRN Route: IV  PRN Reasons: moderate pain (4-6),severe pain (7-10)  Start: 01/18/17 1519            1722       2014       2304        0513       0826          ondansetron (ZOFRAN) tablet 4 mg  Dose: 4 mg Freq: Every 6 Hours PRN Route: PO  PRN Reasons: nausea,vomiting  Start: 09/24/16 0033 End: 09/29/16 1630                Pharmacy to dose vancomycin  Freq: Continuous PRN Route: XX  PRN Reason: consult  PRN Comment: pharmacy to dose vancomycin begining with second dose  Start: 01/18/17 1228 End: 01/18/17 1633 1633-D/C'd       sodium chloride (OCEAN) nasal spray 2 spray  Dose: 2 spray Freq: As Needed Route: EACH NARE  PRN Reason: congestion  Start: 09/24/16 0033 End: 09/29/16 1630    Admin Instructions:                   traZODone (DESYREL) tablet 100 mg  Dose: 100 mg Freq: Nightly PRN Route: PO  PRN Reason: sleep  PRN Comment: insomnia  Start: 09/24/16 0033 End: 09/29/16 1630    Admin Instructions:   Take with food.                Medications 01/10 01/11 01/12 01/13 01/14 01/15 01/16 01/17 01/18 01/19              Consult Notes (last 72 hours) (Notes from 1/16/2017 10:07 AM through 1/19/2017 10:07 AM)      Darya Martinez RPH at 1/18/2017  4:33 PM  Version 1 of 1         Pharmacy was consulted for vancomycin dosing for cellulitis LLE.  Based on pt parameters will initiate vancomycin at 1500mg iv q12hrs to target trough levels of 15-20 mg/L.  Pharmacy will continue to follow and obtain trough level once steady state is achieved.  Thank you.        Electronically signed by Darya Martinez RPH at 1/18/2017  4:33 PM      Manpreet Garcia MD at 1/19/2017  9:44 AM  Version 1 of 1     Consult Orders:    1. Inpatient Consult to General Surgery [[id number]] ordered by Edgar Sanon MD at 01/19/17 0705                Inpatient Consult to  General Surgery  Consult performed by: RODNEY TREVINO  Consult ordered by: CLARA POSADAS  Reason for consult: left ankle abscess and cellulitis  Assessment/Recommendations: OR for incision and drainage          Patient Care Team:  Clara Posadas MD as PCP - General  Clara Posadas MD as PCP - Family Medicine    Chief complaint: Left foot swelling/cellulitis    Subjective     HPI Comments: Patient with diabetes.  No crepitance or signs of osteomyelitis on imaging.  Lactic acidosis present    Foot Problem   This is a new problem. The current episode started in the past 7 days. The problem occurs constantly. The problem has been gradually worsening. Pertinent negatives include no abdominal pain, chest pain, chills, coughing, fever, headaches, joint swelling, nausea, rash, sore throat, vomiting or weakness. Nothing aggravates the symptoms.       Review of Systems   Constitutional: Negative for activity change, appetite change, chills and fever.   HENT: Negative for sore throat and trouble swallowing.    Eyes: Negative for visual disturbance.   Respiratory: Negative for cough and shortness of breath.    Cardiovascular: Negative for chest pain and palpitations.   Gastrointestinal: Negative for abdominal distention, abdominal pain, blood in stool, constipation, diarrhea, nausea and vomiting.   Endocrine: Negative for cold intolerance and heat intolerance.   Genitourinary: Negative for dysuria.   Musculoskeletal: Negative for joint swelling.   Skin: Positive for wound. Negative for color change and rash.   Allergic/Immunologic: Negative for immunocompromised state.   Neurological: Negative for dizziness, seizures, weakness and headaches.   Hematological: Negative for adenopathy. Does not bruise/bleed easily.   Psychiatric/Behavioral: Negative for agitation and confusion.        Past Medical History   Diagnosis Date   • Alcohol abuse    • Anxiety    • Depression    • Diabetes    • GERD (gastroesophageal  reflux disease)    • Hepatic encephalopathy    • Hypertension    • Liver disease    • Neuropathic pain    • Withdrawal symptoms, alcohol    ,   Past Surgical History   Procedure Laterality Date   • Knee surgery Right    ,   Family History   Problem Relation Age of Onset   • Family history unknown: Yes   ,   Social History   Substance Use Topics   • Smoking status: Current Every Day Smoker     Packs/day: 1.00     Years: 40.00     Types: Cigarettes   • Smokeless tobacco: Never Used      Comment: declines   • Alcohol use Yes      Comment: patient says he started drinking at age 11. drank heavy for several years starting about 45 years ago. has been drinking more which is now  up to a  5th a day this time for about 7 months.    ,   Prescriptions Prior to Admission   Medication Sig Dispense Refill Last Dose   • ALPRAZolam (XANAX) 1 MG tablet Take 1 mg by mouth 2 (Two) Times a Day As Needed for anxiety.   1/18/2017 at 0500   • citalopram (CeleXA) 20 MG tablet Take 20 mg by mouth daily.   1/18/2017 at 0500   • DULoxetine (CYMBALTA) 60 MG capsule Take 60 mg by mouth daily.   1/18/2017 at 0500   • gabapentin (NEURONTIN) 800 MG tablet Take 800 mg by mouth 4 (four) times a day.   1/18/2017 at 0500   • glipiZIDE (GLUCOTROL) 5 MG tablet Take 5 mg by mouth 2 (two) times a day before meals.   1/18/2017 at 0500   • pantoprazole (PROTONIX) 40 MG EC tablet Take 40 mg by mouth 2 (two) times a day.   1/18/2017 at 0500   • potassium chloride (MICRO-K) 10 MEQ CR capsule Take 10 mEq by mouth daily.   1/18/2017 at 0500   • spironolactone (ALDACTONE) 50 MG tablet Take 25 mg by mouth Daily. Take 1/2 tablet (25mg) daily   1/18/2017 at 0500   • traMADol (ULTRAM) 50 MG tablet Take 50 mg by mouth 3 (Three) Times a Day As Needed for moderate pain (4-6).   1/18/2017 at 0500    and Allergies:  Sulfa antibiotics    Objective      Vital Signs  Temp:  [97.5 °F (36.4 °C)-98.5 °F (36.9 °C)] 98.5 °F (36.9 °C)  Heart Rate:  [] 107  Resp:  [18]  18  BP: (132-155)/(72-99) 135/72    Physical Exam   Constitutional: He is oriented to person, place, and time. He appears well-developed.   HENT:   Head: Normocephalic and atraumatic.   Mouth/Throat: Mucous membranes are normal.   Eyes: Conjunctivae are normal. Pupils are equal, round, and reactive to light.   Neck: Neck supple. No JVD present. No tracheal deviation present. No thyromegaly present.   Cardiovascular: Normal rate and regular rhythm.  Exam reveals no gallop and no friction rub.  murmur heard.  Pulmonary/Chest: Effort normal and breath sounds normal.   Abdominal: Soft. He exhibits no distension. There is no splenomegaly or hepatomegaly. There is no tenderness. No hernia.   Musculoskeletal: Normal range of motion. He exhibits no deformity.   Left lateral ankle with abscess and surrounding cellulitis and edema involving the foot up to the mid calf.  Palpable pedal pulses.   Neurological: He is alert and oriented to person, place, and time.   Skin: Skin is warm and dry.   Psychiatric: He has a normal mood and affect.       Results Review:    I reviewed the patient's new clinical results.        Assessment&#47;Plan     Active Problems:    Cellulitis of left lower extremity      Assessment:  (Diabetes  Cellulitis  Lactic acidosis  L ankle skin abscess).     Plan:   (Broad spectrum antibiotics  NPO  OR for incision and drainage.).       I discussed the patients findings and my recommendations with patient    Manpreet Garcia MD  01/19/17  9:44 AM           Electronically signed by Manpreet Garcia MD at 1/19/2017  9:47 AM

## 2017-01-19 NOTE — ANESTHESIA PREPROCEDURE EVALUATION
Anesthesia Evaluation      Airway   Mallampati: II  TM distance: >3 FB  Neck ROM: full  no difficulty expected  Dental    (+) upper dentures and poor dentation    Pulmonary - normal exam   Cardiovascular - normal exam  (+) hypertension,     Neuro/Psych  GI/Hepatic/Renal/Endo    (+)  GERD, hepatitis, diabetes mellitus,     Musculoskeletal     Abdominal  - normal exam   Substance History      OB/GYN          Other                           Anesthesia Plan    ASA 3     general     intravenous induction   Anesthetic plan and risks discussed with patient.

## 2017-01-20 LAB
GLUCOSE BLDC GLUCOMTR-MCNC: 142 MG/DL (ref 70–130)
GLUCOSE BLDC GLUCOMTR-MCNC: 235 MG/DL (ref 70–130)
GLUCOSE BLDC GLUCOMTR-MCNC: 245 MG/DL (ref 70–130)
GLUCOSE BLDC GLUCOMTR-MCNC: 280 MG/DL (ref 70–130)
VANCOMYCIN TROUGH SERPL-MCNC: 12.8 MCG/ML (ref 5–15)

## 2017-01-20 PROCEDURE — 82962 GLUCOSE BLOOD TEST: CPT

## 2017-01-20 PROCEDURE — 80202 ASSAY OF VANCOMYCIN: CPT | Performed by: FAMILY MEDICINE

## 2017-01-20 PROCEDURE — 25010000002 VANCOMYCIN PER 500 MG

## 2017-01-20 PROCEDURE — 25010000002 VANCOMYCIN PER 500 MG: Performed by: FAMILY MEDICINE

## 2017-01-20 PROCEDURE — 25010000002 PIPERACILLIN-TAZOBACTAM: Performed by: FAMILY MEDICINE

## 2017-01-20 PROCEDURE — 63710000001 INSULIN ASPART PER 5 UNITS: Performed by: FAMILY MEDICINE

## 2017-01-20 PROCEDURE — 25010000002 MORPHINE SULFATE (PF) 2 MG/ML SOLUTION: Performed by: FAMILY MEDICINE

## 2017-01-20 PROCEDURE — 99024 POSTOP FOLLOW-UP VISIT: CPT | Performed by: SURGERY

## 2017-01-20 RX ADMIN — INSULIN ASPART 3 UNITS: 100 INJECTION, SOLUTION INTRAVENOUS; SUBCUTANEOUS at 18:09

## 2017-01-20 RX ADMIN — GABAPENTIN 800 MG: 400 CAPSULE ORAL at 00:05

## 2017-01-20 RX ADMIN — INSULIN ASPART 3 UNITS: 100 INJECTION, SOLUTION INTRAVENOUS; SUBCUTANEOUS at 12:43

## 2017-01-20 RX ADMIN — MORPHINE SULFATE 2 MG: 2 INJECTION, SOLUTION INTRAMUSCULAR; INTRAVENOUS at 12:43

## 2017-01-20 RX ADMIN — VANCOMYCIN HYDROCHLORIDE 1500 MG: 5 INJECTION, POWDER, LYOPHILIZED, FOR SOLUTION INTRAVENOUS at 08:40

## 2017-01-20 RX ADMIN — GABAPENTIN 800 MG: 400 CAPSULE ORAL at 18:09

## 2017-01-20 RX ADMIN — PANTOPRAZOLE SODIUM 40 MG: 40 TABLET, DELAYED RELEASE ORAL at 06:33

## 2017-01-20 RX ADMIN — CITALOPRAM HYDROBROMIDE 20 MG: 20 TABLET ORAL at 08:40

## 2017-01-20 RX ADMIN — PIPERACILLIN SODIUM,TAZOBACTAM SODIUM 3.38 G: 3; .375 INJECTION, POWDER, FOR SOLUTION INTRAVENOUS at 00:05

## 2017-01-20 RX ADMIN — PIPERACILLIN SODIUM,TAZOBACTAM SODIUM 3.38 G: 3; .375 INJECTION, POWDER, FOR SOLUTION INTRAVENOUS at 18:12

## 2017-01-20 RX ADMIN — INSULIN ASPART 4 UNITS: 100 INJECTION, SOLUTION INTRAVENOUS; SUBCUTANEOUS at 21:18

## 2017-01-20 RX ADMIN — MORPHINE SULFATE 2 MG: 2 INJECTION, SOLUTION INTRAMUSCULAR; INTRAVENOUS at 19:42

## 2017-01-20 RX ADMIN — GABAPENTIN 800 MG: 400 CAPSULE ORAL at 12:43

## 2017-01-20 RX ADMIN — GLIPIZIDE 5 MG: 5 TABLET ORAL at 08:41

## 2017-01-20 RX ADMIN — PIPERACILLIN SODIUM,TAZOBACTAM SODIUM 3.38 G: 3; .375 INJECTION, POWDER, FOR SOLUTION INTRAVENOUS at 12:42

## 2017-01-20 RX ADMIN — GABAPENTIN 800 MG: 400 CAPSULE ORAL at 06:33

## 2017-01-20 RX ADMIN — MORPHINE SULFATE 2 MG: 2 INJECTION, SOLUTION INTRAMUSCULAR; INTRAVENOUS at 03:24

## 2017-01-20 RX ADMIN — MORPHINE SULFATE 2 MG: 2 INJECTION, SOLUTION INTRAMUSCULAR; INTRAVENOUS at 09:38

## 2017-01-20 RX ADMIN — PANTOPRAZOLE SODIUM 40 MG: 40 TABLET, DELAYED RELEASE ORAL at 18:12

## 2017-01-20 RX ADMIN — POTASSIUM CHLORIDE 10 MEQ: 750 TABLET, FILM COATED, EXTENDED RELEASE ORAL at 08:41

## 2017-01-20 RX ADMIN — SPIRONOLACTONE 25 MG: 25 TABLET, FILM COATED ORAL at 08:40

## 2017-01-20 RX ADMIN — DULOXETINE HYDROCHLORIDE 60 MG: 60 CAPSULE, DELAYED RELEASE ORAL at 08:41

## 2017-01-20 RX ADMIN — MORPHINE SULFATE 2 MG: 2 INJECTION, SOLUTION INTRAMUSCULAR; INTRAVENOUS at 06:34

## 2017-01-20 RX ADMIN — VANCOMYCIN HYDROCHLORIDE 1750 MG: 5 INJECTION, POWDER, LYOPHILIZED, FOR SOLUTION INTRAVENOUS at 21:19

## 2017-01-20 RX ADMIN — MORPHINE SULFATE 2 MG: 2 INJECTION, SOLUTION INTRAMUSCULAR; INTRAVENOUS at 00:05

## 2017-01-20 RX ADMIN — GLIPIZIDE 5 MG: 5 TABLET ORAL at 18:12

## 2017-01-20 RX ADMIN — PIPERACILLIN SODIUM,TAZOBACTAM SODIUM 3.38 G: 3; .375 INJECTION, POWDER, FOR SOLUTION INTRAVENOUS at 06:34

## 2017-01-20 NOTE — PROGRESS NOTES
No acute events overnight.  No subcutaneous abscess discovered on incision and drainage at the site of ulceration.    Afebrile, vital signs stable  Left lower extremity edema and cellulitis improving.  No purulent drainage.    56-year-old male with left lower extremity cellulitis.  At the site of skin breakdown there is no abscess found upon incision and drainage.  The wound will be packed with twice daily wet-to-dry gauze and the patient will follow-up in 2 weeks.  Continue antibiotics.

## 2017-01-20 NOTE — PROGRESS NOTES
Haim Snell 56 y.o.   01/20/17    Subjective: Patient states is a left foreleg feels some better  Objective: Some erythema and warmth overside dorsal foot but looks like the erythema on his left lower extremity has has decreased  Vital Signs (last 72 hrs)       01/16 0700  -  01/17 0659 01/17 0700  -  01/18 0659 01/18 0700  -  01/19 0659 01/19 0700  -  01/20 0653   Most Recent    Temp (°F)     97.5 -  98.2    97.8 -  98.6     98.2 (36.8)    Heart Rate     98 -  120    79 -  107     96    Resp     16 - 18 16 - 20     18    BP     132/72 -  155/99    119/72 -  167/96     128/74    SpO2 (%)     (!)81 -  98    93 -  98     94        Lab Results (last 72 hours)     Procedure Component Value Units Date/Time    CBC & Differential [[id number]] Collected:  01/18/17 1000    Specimen:  Blood Updated:  01/18/17 1010    Narrative:       The following orders were created for panel order CBC & Differential.  Procedure                               Abnormality         Status                     ---------                               -----------         ------                     CBC Auto Differential[[id number]]         Abnormal            Final result                 Please view results for these tests on the individual orders.    CBC Auto Differential [[id number]]  (Abnormal) Collected:  01/18/17 1000    Specimen:  Blood Updated:  01/18/17 1010     WBC 4.65 10*3/mm3      RBC 4.68 (L) 10*6/mm3      Hemoglobin 12.8 (L) g/dL      Hematocrit 41.7 (L) %      MCV 89.1 fL      MCH 27.4 pg      MCHC 30.7 (L) g/dL      RDW 18.0 (H) %      RDW-SD 56.6 (H) fl      MPV 9.8 fL      Platelets 193 10*3/mm3      Neutrophil % 54.2 %      Lymphocyte % 35.1 %      Monocyte % 9.0 %      Eosinophil % 1.5 %      Basophil % 0.2 %      Immature Grans % 0.0 %      Neutrophils, Absolute 2.52 10*3/mm3      Lymphocytes, Absolute 1.63 10*3/mm3      Monocytes, Absolute 0.42 10*3/mm3      Eosinophils, Absolute 0.07 10*3/mm3      Basophils, Absolute 0.01  10*3/mm3      Immature Grans, Absolute 0.00 10*3/mm3     Lactic Acid, Plasma [[id number]]  (Abnormal) Collected:  01/18/17 1000    Specimen:  Blood Updated:  01/18/17 1025     Lactate 3.0 (C) mmol/L     C-reactive Protein [[id number]]  (Normal) Collected:  01/18/17 1000    Specimen:  Blood Updated:  01/18/17 1026     C-Reactive Protein 0.91 mg/dL     Uric Acid [[id number]]  (Normal) Collected:  01/18/17 1000    Specimen:  Blood Updated:  01/18/17 1027     Uric Acid 4.0 mg/dL     Comprehensive Metabolic Panel [[id number]]  (Abnormal) Collected:  01/18/17 1000    Specimen:  Blood Updated:  01/18/17 1028     Glucose 353 (H) mg/dL      BUN 6 (L) mg/dL      Creatinine 0.71 mg/dL      Sodium 145 mmol/L      Potassium 3.5 mmol/L      Chloride 102 mmol/L      CO2 32.8 (H) mmol/L      Calcium 8.7 mg/dL      Total Protein 8.2 (H) g/dL      Albumin 3.80 g/dL      ALT (SGPT) 24 U/L      AST (SGOT) 37 (H) U/L      Alkaline Phosphatase 248 (H) U/L      Total Bilirubin 0.3 mg/dL      eGFR Non African Amer 114 mL/min/1.73      Globulin 4.4 gm/dL      A/G Ratio 0.9 (L) g/dL      BUN/Creatinine Ratio 8.5      Anion Gap 10.2 mmol/L     Osmolality, Calculated [[id number]]  (Normal) Collected:  01/18/17 1000    Specimen:  Blood Updated:  01/18/17 1028     Osmolality Calc 300.5 mOsm/kg     Sedimentation Rate [[id number]]  (Abnormal) Collected:  01/18/17 1000    Specimen:  Blood Updated:  01/18/17 1028     Sed Rate 29 (H) mm/hr     Urinalysis With / Culture If Indicated [[id number]]  (Abnormal) Collected:  01/18/17 1141    Specimen:  Urine from Urine, Clean Catch Updated:  01/18/17 1147     Color, UA Yellow      Appearance, UA Clear      pH, UA 7.0      Specific Gravity, UA 1.016      Glucose, UA >=1000 mg/dL (3+) (A)      Ketones, UA Negative      Bilirubin, UA Negative      Blood, UA Negative      Protein, UA Negative      Leuk Esterase, UA Negative      Nitrite, UA Negative      Urobilinogen, UA 1.0 E.U./dL     Narrative:       Urine microscopic not  indicated.    Light Blue Top [[id number]] Collected:  01/18/17 1000    Specimen:  Blood Updated:  01/18/17 1401     Extra Tube hold for add-on       Auto resulted       Lavender Top [[id number]] Collected:  01/18/17 1000    Specimen:  Blood Updated:  01/18/17 1401     Extra Tube hold for add-on       Auto resulted       [place] Draw [[id number]] Collected:  01/18/17 1000    Specimen:  Blood Updated:  01/18/17 1401    Narrative:       The following orders were created for panel order [place] Draw.  Procedure                               Abnormality         Status                     ---------                               -----------         ------                     Light Blue Top[[id number]]                                    Final result               Green Top (Gel)[[id number]]                                   Final result               Lavender Top[[id number]]                                      Final result               Gold Top - SST[[id number]]                                    Final result                 Please view results for these tests on the individual orders.    Green Top (Gel) [[id number]] Collected:  01/18/17 1000    Specimen:  Blood Updated:  01/18/17 1401     Extra Tube Hold for add-ons.       Auto resulted.       Gold Top - SST [[id number]] Collected:  01/18/17 1000    Specimen:  Blood Updated:  01/18/17 1401     Extra Tube Hold for add-ons.       Auto resulted.       Lactate Acid, Reflex [[id number]]  (Abnormal) Collected:  01/18/17 1408    Specimen:  Blood Updated:  01/18/17 1510     Lactate 2.7 (C) mmol/L     POC Glucose Fingerstick [[id number]]  (Abnormal) Collected:  01/18/17 1654    Specimen:  Blood Updated:  01/18/17 1658     Glucose 206 (H) mg/dL     Narrative:       Meter: [id number] : [id number] mayne mary    POC Glucose Fingerstick [[id number]]  (Abnormal) Collected:  01/18/17 2006    Specimen:  Blood Updated:  01/18/17 2010     Glucose 296 (H) mg/dL     Narrative:       Meter: [id number] :  [id number] [facility]    Blood Culture [[id number]]  (Normal) Collected:  01/18/17 1000    Specimen:  Blood from Arm, Right Updated:  01/19/17 1101     Blood Culture No growth at 24 hours     Blood Culture [[id number]]  (Normal) Collected:  01/18/17 1038    Specimen:  Blood from Arm, Left Updated:  01/19/17 1101     Blood Culture No growth at 24 hours     POC Glucose Fingerstick [[id number]]  (Normal) Collected:  01/19/17 1126    Specimen:  Blood Updated:  01/19/17 1138     Glucose 122 mg/dL     Narrative:       Meter: [id number] : [id number] [employer]    POC Glucose Fingerstick [[id number]]  (Abnormal) Collected:  01/19/17 1308    Specimen:  Blood Updated:  01/19/17 1312     Glucose 134 (H) mg/dL     Narrative:       Meter: [id number] : [id number] Alyssia MORALES    POC Glucose Fingerstick [[id number]]  (Abnormal) Collected:  01/19/17 1714    Specimen:  Blood Updated:  01/19/17 1726     Glucose 250 (H) mg/dL     Narrative:       Meter: [id number] : [id number] [employer]    POC Glucose Fingerstick [[id number]]  (Abnormal) Collected:  01/19/17 2052    Specimen:  Blood Updated:  01/19/17 2055     Glucose 219 (H) mg/dL     Narrative:       Meter: [id number] : [id number] [facility]        Imaging Results (last 24 hours)     ** No results found for the last 24 hours. **        Assessment:Active Problems:    Cellulitis of left lower extremity   Cirrhosis  Plan: Continue IV antibiotics and wound care

## 2017-01-20 NOTE — PROGRESS NOTES
Discharge Planning Assessment  [facility]     Patient Name: Haim Snell  MRN: [id number]  Today's Date: 1/20/2017    Admit Date: 1/18/2017       Discharge Plan       01/20/17 1211    Case Management/Social Work Plan    Plan SS spoke to pt on Thursday, 1-19-17. Pt lives at home with sign. other staying with him most of the time and plans to return home at discharge. Pt has a straight cane and rolling walker. Pt does not utilize home health services. SS to follow.               Josefa Hatfield

## 2017-01-20 NOTE — PLAN OF CARE
Problem: Patient Care Overview (Adult)  Goal: Plan of Care Review  Outcome: Ongoing (interventions implemented as appropriate)    01/20/17 0512   Coping/Psychosocial Response Interventions   Plan Of Care Reviewed With patient   Patient Care Overview   Progress improving         Problem: Fall Risk (Adult)  Goal: Identify Related Risk Factors and Signs and Symptoms  Outcome: Ongoing (interventions implemented as appropriate)  Goal: Absence of Falls  Outcome: Ongoing (interventions implemented as appropriate)    Problem: Infection, Risk/Actual (Adult)  Goal: Identify Related Risk Factors and Signs and Symptoms  Outcome: Ongoing (interventions implemented as appropriate)  Goal: Infection Prevention/Resolution  Outcome: Ongoing (interventions implemented as appropriate)    Problem: Cellulitis (Adult)  Goal: Signs and Symptoms of Listed Potential Problems Will be Absent or Manageable (Cellulitis)  Outcome: Ongoing (interventions implemented as appropriate)    Problem: Perioperative Period (Adult)  Goal: Signs and Symptoms of Listed Potential Problems Will be Absent or Manageable (Perioperative Period)  Outcome: Ongoing (interventions implemented as appropriate)

## 2017-01-20 NOTE — CONSULTS
"Diabetes Education  Assessment/Teaching    Patient Name:  Haim Snell  YOB: 1960  MRN: [id number]  Admit Date:  1/18/2017      Assessment Date:  1/20/2017       Most Recent Value    General Information      Height  5' 10\" (1.778 m)    Height Method  Stated    Weight  190 lb (86.2 kg)    Weight Method  Stated    Pregnancy Assessment     Diabetes History     What type of diabetes do you have?  Type 2    Length of Diabetes Diagnosis  1 - 5 years    Current DM knowledge  fair    Do you test your blood sugar at home?  yes    How would you rate your diabetes control?  poor [\"been homeless and just did not care\" ]    Has diabetes caused a problem in your life (work/school/family/friends, etc.)?  yes    Do you have any diabetes complications?  foot ulcers [\" do not know how got place on foot\"]    Education Preferences     Nutrition Information     Assessment Topics     Healthy Eating - Assessment  Competent    Being Active - Assessment  Competent    Taking Medication - Assessment  Competent    Problem Solving - Assessment  Competent    Reducing Risk - Assessment  Competent    Healthy Coping - Assessment  Competent    Monitoring - Assessment  Competent    DM Goals                Most Recent Value    DM Education Needs     Meter  Has own    Problem Solving  Other (comment) [discussed benefits of No Smoking to healing and alcohol  and liver]    Healthy Coping  Appropriate    Discharge Plan  Home    Teaching Method  Explanation, Discussion, Teach back    Patient Response  Verbalized understanding            Other Comments:          Electronically signed by:  Sara Sapp RN  01/20/17 11:30 AM  "

## 2017-01-20 NOTE — PLAN OF CARE
"Problem: Patient Care Overview (Adult)  Goal: Plan of Care Review  Outcome: Ongoing (interventions implemented as appropriate)  Pt stated \"feel better today\", swelling, redness L ankle came down.    01/20/17 9069   Coping/Psychosocial Response Interventions   Plan Of Care Reviewed With patient;significant other   Patient Care Overview   Progress improving         Problem: Fall Risk (Adult)  Goal: Identify Related Risk Factors and Signs and Symptoms  Outcome: Ongoing (interventions implemented as appropriate)  Goal: Absence of Falls  Outcome: Ongoing (interventions implemented as appropriate)    Problem: Infection, Risk/Actual (Adult)  Goal: Infection Prevention/Resolution  Outcome: Ongoing (interventions implemented as appropriate)    Problem: Cellulitis (Adult)  Goal: Signs and Symptoms of Listed Potential Problems Will be Absent or Manageable (Cellulitis)  Outcome: Ongoing (interventions implemented as appropriate)    Problem: Diabetes, Type 2 (Adult)  Goal: Signs and Symptoms of Listed Potential Problems Will be Absent or Manageable (Diabetes, Type 2)  Outcome: Ongoing (interventions implemented as appropriate)      "

## 2017-01-20 NOTE — PROGRESS NOTES
Patient continues on day 3 vancomycin. Vancomycin trough level was reported as 12.8 mg/L today. Based on this level, will increase vancomycin dose to 1750 mg q12 hrs. Will continue to follow and recheck a trough level when appropriate.    Thank you,    Josette Perez RP

## 2017-01-21 LAB
GLUCOSE BLDC GLUCOMTR-MCNC: 166 MG/DL (ref 70–130)
GLUCOSE BLDC GLUCOMTR-MCNC: 212 MG/DL (ref 70–130)
GLUCOSE BLDC GLUCOMTR-MCNC: 265 MG/DL (ref 70–130)
GLUCOSE BLDC GLUCOMTR-MCNC: 317 MG/DL (ref 70–130)

## 2017-01-21 PROCEDURE — 25010000002 ENOXAPARIN PER 10 MG: Performed by: FAMILY MEDICINE

## 2017-01-21 PROCEDURE — 63710000001 INSULIN ASPART PER 5 UNITS: Performed by: FAMILY MEDICINE

## 2017-01-21 PROCEDURE — 25010000002 MORPHINE SULFATE (PF) 2 MG/ML SOLUTION: Performed by: FAMILY MEDICINE

## 2017-01-21 PROCEDURE — 25010000002 PIPERACILLIN-TAZOBACTAM: Performed by: FAMILY MEDICINE

## 2017-01-21 PROCEDURE — 82962 GLUCOSE BLOOD TEST: CPT

## 2017-01-21 PROCEDURE — 25010000002 VANCOMYCIN PER 500 MG: Performed by: FAMILY MEDICINE

## 2017-01-21 RX ADMIN — DULOXETINE HYDROCHLORIDE 60 MG: 60 CAPSULE, DELAYED RELEASE ORAL at 08:07

## 2017-01-21 RX ADMIN — INSULIN ASPART 10 UNITS: 100 INJECTION, SOLUTION INTRAVENOUS; SUBCUTANEOUS at 12:00

## 2017-01-21 RX ADMIN — PANTOPRAZOLE SODIUM 40 MG: 40 TABLET, DELAYED RELEASE ORAL at 08:07

## 2017-01-21 RX ADMIN — GABAPENTIN 800 MG: 400 CAPSULE ORAL at 00:24

## 2017-01-21 RX ADMIN — VANCOMYCIN HYDROCHLORIDE 1750 MG: 5 INJECTION, POWDER, LYOPHILIZED, FOR SOLUTION INTRAVENOUS at 08:25

## 2017-01-21 RX ADMIN — GLIPIZIDE 5 MG: 5 TABLET ORAL at 08:07

## 2017-01-21 RX ADMIN — GABAPENTIN 800 MG: 400 CAPSULE ORAL at 05:24

## 2017-01-21 RX ADMIN — PIPERACILLIN SODIUM,TAZOBACTAM SODIUM 3.38 G: 3; .375 INJECTION, POWDER, FOR SOLUTION INTRAVENOUS at 00:21

## 2017-01-21 RX ADMIN — PIPERACILLIN SODIUM,TAZOBACTAM SODIUM 3.38 G: 3; .375 INJECTION, POWDER, FOR SOLUTION INTRAVENOUS at 12:00

## 2017-01-21 RX ADMIN — MORPHINE SULFATE 2 MG: 2 INJECTION, SOLUTION INTRAMUSCULAR; INTRAVENOUS at 16:17

## 2017-01-21 RX ADMIN — MORPHINE SULFATE 2 MG: 2 INJECTION, SOLUTION INTRAMUSCULAR; INTRAVENOUS at 22:58

## 2017-01-21 RX ADMIN — PIPERACILLIN SODIUM,TAZOBACTAM SODIUM 3.38 G: 3; .375 INJECTION, POWDER, FOR SOLUTION INTRAVENOUS at 05:24

## 2017-01-21 RX ADMIN — INSULIN ASPART 8 UNITS: 100 INJECTION, SOLUTION INTRAVENOUS; SUBCUTANEOUS at 20:23

## 2017-01-21 RX ADMIN — MORPHINE SULFATE 2 MG: 2 INJECTION, SOLUTION INTRAMUSCULAR; INTRAVENOUS at 03:35

## 2017-01-21 RX ADMIN — MORPHINE SULFATE 2 MG: 2 INJECTION, SOLUTION INTRAMUSCULAR; INTRAVENOUS at 11:57

## 2017-01-21 RX ADMIN — INSULIN ASPART 2 UNITS: 100 INJECTION, SOLUTION INTRAVENOUS; SUBCUTANEOUS at 08:10

## 2017-01-21 RX ADMIN — MORPHINE SULFATE 2 MG: 2 INJECTION, SOLUTION INTRAMUSCULAR; INTRAVENOUS at 00:33

## 2017-01-21 RX ADMIN — POTASSIUM CHLORIDE 10 MEQ: 750 TABLET, FILM COATED, EXTENDED RELEASE ORAL at 08:07

## 2017-01-21 RX ADMIN — CITALOPRAM HYDROBROMIDE 20 MG: 20 TABLET ORAL at 08:07

## 2017-01-21 RX ADMIN — INSULIN ASPART 5 UNITS: 100 INJECTION, SOLUTION INTRAVENOUS; SUBCUTANEOUS at 18:01

## 2017-01-21 RX ADMIN — MORPHINE SULFATE 2 MG: 2 INJECTION, SOLUTION INTRAMUSCULAR; INTRAVENOUS at 19:45

## 2017-01-21 RX ADMIN — MORPHINE SULFATE 2 MG: 2 INJECTION, SOLUTION INTRAMUSCULAR; INTRAVENOUS at 08:07

## 2017-01-21 RX ADMIN — GABAPENTIN 800 MG: 400 CAPSULE ORAL at 14:55

## 2017-01-21 RX ADMIN — SPIRONOLACTONE 25 MG: 25 TABLET, FILM COATED ORAL at 08:07

## 2017-01-21 RX ADMIN — GLIPIZIDE 5 MG: 5 TABLET ORAL at 18:00

## 2017-01-21 RX ADMIN — PIPERACILLIN SODIUM,TAZOBACTAM SODIUM 3.38 G: 3; .375 INJECTION, POWDER, FOR SOLUTION INTRAVENOUS at 18:01

## 2017-01-21 RX ADMIN — PANTOPRAZOLE SODIUM 40 MG: 40 TABLET, DELAYED RELEASE ORAL at 18:00

## 2017-01-21 RX ADMIN — GABAPENTIN 800 MG: 400 CAPSULE ORAL at 18:01

## 2017-01-21 RX ADMIN — ENOXAPARIN SODIUM 40 MG: 40 INJECTION SUBCUTANEOUS at 11:56

## 2017-01-21 RX ADMIN — VANCOMYCIN HYDROCHLORIDE 1750 MG: 5 INJECTION, POWDER, LYOPHILIZED, FOR SOLUTION INTRAVENOUS at 20:23

## 2017-01-21 NOTE — PROGRESS NOTES
"     LOS: 2 days     Chief Complaint:  Left lower extremity cellulitis    Subjective     Interval History:     His blood sugars are usually greater than 200 and sometimes up to as high as 280.  He continues to have a lot of pain in his left leg.  His foot is continued to be erythematous.  He states it's hurting continuously from the mid aspect of his leg down into the foot.  Has not really changed significantly.  He doesn't think it looks a lot better.  He is having no fevers.  He had and D of the wound but there was no abscess, just inflamed tissue.  He continues on vancomycin and he continues on Zosyn.  At home he takes glipizide 5 mg twice a day and states his sugars are usually pretty good.      Objective     Vital Signs  Visit Vitals   • /69 (BP Location: Left arm, Patient Position: Lying)   • Pulse 76   • Temp 97.6 °F (36.4 °C) (Oral)   • Resp 18   • Ht 70\" (177.8 cm)   • Wt 190 lb (86.2 kg)   • SpO2 94%   • BMI 27.26 kg/m2     Intake & Output (last day)       01/20 0701 - 01/21 0700 01/21 0701 - 01/22 0700    P.O. 1440     I.V. (mL/kg) 500 (5.8)     IV Piggyback 200     Total Intake(mL/kg) 2140 (24.8)     Urine (mL/kg/hr) 2500 (1.2)     Stool 0 (0)     Total Output 2500      Net -360            Unmeasured Urine Occurrence 1 x     Unmeasured Stool Occurrence 0 x             Physical Exam:     General Appearance:    Alert, cooperative, in no acute distress   Head:    Normocephalic, without obvious abnormality, atraumatic   Eyes:            Lids and lashes normal, conjunctivae and sclerae normal, no   icterus, no pallor, corneas clear, PERRLA   Ears:    Ears appear intact with no abnormalities noted   Throat:   No oral lesions, no thrush, oral mucosa moist   Neck:   No adenopathy, supple, trachea midline, no thyromegaly, no   carotid bruit, no JVD   Lungs:     Clear to auscultation,respirations regular, even and                  unlabored    Heart:    Regular rhythm and normal rate, normal S1 and S2, no    "         murmur, no gallop, no rub, no click   Chest Wall:    No abnormalities observed   Abdomen:     Normal bowel sounds, no masses, no organomegaly, soft        non-tender, non-distended, no guarding, no rebound                tenderness   Extremities:   Moves all extremities well, no edema, no cyanosis, no             Redness. LLE with erythema @ foot and up to ankle.  Warm to touch.  Tender just above the ankle @ site of I&D   Pulses:   Pulses palpable and equal bilaterally   Skin:   No bleeding, bruising or rash   Lymph nodes:   No palpable adenopathy   Neurologic:   Cranial nerves 2 - 12 grossly intact, sensation intact, DTR       present and equal bilaterally        Results Review:    Lab Results   Component Value Date    WBC 4.65 01/18/2017    HGB 12.8 (L) 01/18/2017    HCT 41.7 (L) 01/18/2017    MCV 89.1 01/18/2017     01/18/2017       Lab Results   Component Value Date    GLUCOSE 353 (H) 01/18/2017    BUN 6 (L) 01/18/2017    CREATININE 0.71 01/18/2017    EGFRIFNONA 114 01/18/2017    EGFRIFAFRI  09/23/2016      Comment:      <15 Indicative of kidney failure.    BCR 8.5 01/18/2017     01/18/2017    K 3.5 01/18/2017     01/18/2017    CO2 32.8 (H) 01/18/2017    CALCIUM 8.7 01/18/2017    ALBUMIN 3.80 01/18/2017    LABIL2 0.9 (L) 01/18/2017    AST 37 (H) 01/18/2017    ALT 24 01/18/2017     Lab Results   Component Value Date    INR 1.17 (H) 02/16/2016    INR 1.24 (H) 02/11/2016    INR 1.23 (H) 02/09/2016       GLUCOSE   Date Value Ref Range Status   01/21/2017 166 (H) 70 - 130 mg/dL Final   01/20/2017 280 (H) 70 - 130 mg/dL Final   01/20/2017 235 (H) 70 - 130 mg/dL Final   01/20/2017 245 (H) 70 - 130 mg/dL Final   01/20/2017 142 (H) 70 - 130 mg/dL Final          Medication Review:     Current Facility-Administered Medications:   •  ALPRAZolam (XANAX) tablet 1 mg, 1 mg, Oral, BID PRN, Reza Hugo, MD  •  citalopram (CeleXA) tablet 20 mg, 20 mg, Oral, Daily, Reza Arias MD, 20 mg at 01/21/17  0807  •  dextrose (D50W) solution 25 g, 25 g, Intravenous, Q15 Min PRN, Edgar Sanon MD  •  dextrose (GLUTOSE) oral gel 15 g, 15 g, Oral, Q15 Min PRN, Edgar Sanon MD  •  DULoxetine (CYMBALTA) DR capsule 60 mg, 60 mg, Oral, Daily, Reza Arias MD, 60 mg at 01/21/17 0807  •  gabapentin (NEURONTIN) capsule 800 mg, 800 mg, Oral, Q6H, Reza Arias MD, 800 mg at 01/21/17 0524  •  glipiZIDE (GLUCOTROL) tablet 5 mg, 5 mg, Oral, BID AC, Reza Arias MD, 5 mg at 01/21/17 0807  •  glucagon (GLUCAGEN) injection 1 mg, 1 mg, Subcutaneous, Q15 Min PRN, Edgar Sanon MD  •  insulin aspart (novoLOG) injection 0-7 Units, 0-7 Units, Subcutaneous, 4x Daily AC & at Bedtime, Edgar Sanon MD, 2 Units at 01/21/17 0810  •  Morphine sulfate (PF) injection 2 mg, 2 mg, Intravenous, Q3H PRN, Radu Arias MD, 2 mg at 01/21/17 0807  •  pantoprazole (PROTONIX) EC tablet 40 mg, 40 mg, Oral, BID AC, Reza Arias MD, 40 mg at 01/21/17 0807  •  piperacillin-tazobactam (ZOSYN) 3.375 g/100 mL 0.9% NS IVPB (mbp), 3.375 g, Intravenous, Q6H, Radu Arias MD, 3.375 g at 01/21/17 0524  •  potassium chloride (K-DUR,KLOR-CON) ER tablet 10 mEq, 10 mEq, Oral, Daily, Reza Arias MD, 10 mEq at 01/21/17 0807  •  spironolactone (ALDACTONE) tablet 25 mg, 25 mg, Oral, Daily, Reza Arias MD, 25 mg at 01/21/17 0807  •  vancomycin (VANCOCIN) 1,750 mg in sodium chloride 0.9 % 500 mL IVPB, 1,750 mg, Intravenous, Q12H, Edgar Sanon MD, 1,750 mg at 01/20/17 1021      Assessment/Plan     LLE cellulitis  Tobacco abuse  Type II Diabetes Mellitus with hyperglycemia    Increase sliding scale insulin coverage.  Continue glipizide.    Start Lovenox for DVT prophylaxis    Continue Zosyn and vancomycin.  His foot continues to be significantly erythematous.  Plan to keep them moist another 24 hours.  CBC and BMP tomorrow morning    He refuses to quit smoking    Samuel Duane Kreis, MD  01/21/17  8:13 AM

## 2017-01-21 NOTE — PLAN OF CARE
Problem: Infection, Risk/Actual (Adult)  Goal: Identify Related Risk Factors and Signs and Symptoms  Outcome: Ongoing (interventions implemented as appropriate)  Goal: Infection Prevention/Resolution  Outcome: Ongoing (interventions implemented as appropriate)    Problem: Cellulitis (Adult)  Goal: Signs and Symptoms of Listed Potential Problems Will be Absent or Manageable (Cellulitis)  Outcome: Ongoing (interventions implemented as appropriate)    Problem: Diabetes, Type 2 (Adult)  Goal: Signs and Symptoms of Listed Potential Problems Will be Absent or Manageable (Diabetes, Type 2)  Outcome: Ongoing (interventions implemented as appropriate)

## 2017-01-21 NOTE — PLAN OF CARE
Problem: Patient Care Overview (Adult)  Goal: Plan of Care Review  Outcome: Ongoing (interventions implemented as appropriate)    01/21/17 0756   Coping/Psychosocial Response Interventions   Plan Of Care Reviewed With patient   Patient Care Overview   Progress progress toward functional goals as expected       Goal: Adult Individualization and Mutuality  Outcome: Ongoing (interventions implemented as appropriate)    Problem: Fall Risk (Adult)  Goal: Identify Related Risk Factors and Signs and Symptoms  Outcome: Ongoing (interventions implemented as appropriate)    01/20/17 1628   Fall Risk   Fall Risk: Related Risk Factors gait/mobility problems;history of falls;environment unfamiliar   Fall Risk: Signs and Symptoms presence of risk factors       Goal: Absence of Falls  Outcome: Ongoing (interventions implemented as appropriate)    01/21/17 0756   Fall Risk (Adult)   Absence of Falls making progress toward outcome         Problem: Infection, Risk/Actual (Adult)  Goal: Identify Related Risk Factors and Signs and Symptoms  Outcome: Ongoing (interventions implemented as appropriate)    01/18/17 1704   Infection, Risk/Actual   Infection, Risk/Actual: Related Risk Factors chronic illness/condition;exposure to microbes;skin integrity impairment   Signs and Symptoms (Infection, Risk/Actual) blood glucose changes;edema;pain;weakness       Goal: Infection Prevention/Resolution  Outcome: Ongoing (interventions implemented as appropriate)    01/21/17 0756   Infection, Risk/Actual (Adult)   Infection Prevention/Resolution making progress toward outcome         Problem: Cellulitis (Adult)  Goal: Signs and Symptoms of Listed Potential Problems Will be Absent or Manageable (Cellulitis)  Outcome: Ongoing (interventions implemented as appropriate)    01/21/17 0756   Cellulitis   Problems Assessed (Cellulitis) all   Problems Present (Cellulitis) pain;infection         Problem: Diabetes, Type 2 (Adult)  Goal: Signs and Symptoms of  Listed Potential Problems Will be Absent or Manageable (Diabetes, Type 2)  Outcome: Ongoing (interventions implemented as appropriate)    01/20/17 1628 01/21/17 0756   Diabetes, Type 2   Problems Assessed (Type 2 Diabetes) --  all   Problems Present (Type 2 Diabetes) impaired glycemic control --

## 2017-01-21 NOTE — PAYOR COMM NOTE
"[facility]   MIMI EUCEDA  PHONE  [phone]  FAX  [phone]    Haim Parker (56 y.o. Male)     Date of Birth Social Security Number Address Home Phone MRN    1960  [address] [address] [phone] [id number]    Nondenominational Marital Status          Unknown        Admission Date Admission Type Admitting Provider Attending Provider Department, Room/Bed    1/18/17 Emergency Edgar Sanon MD Watts, John Michael, MD [address], 3326/1P    Discharge Date Discharge Disposition Discharge Destination                      Attending Provider: Edgar Sanon MD     Allergies:  Sulfa Antibiotics    Isolation:  None   Infection:  None   Code Status:  Prior    Ht:  70\" (177.8 cm)   Wt:  190 lb (86.2 kg)    Admission Cmt:  None   Principal Problem:  None                Active Insurance as of 1/18/2017     Primary Coverage     Payor Plan Insurance Group Employer/Plan Group    [employer] [place] [employer] [place]ICA[place]      Payor Plan Address Payor Plan Phone Number Effective From Effective To    [address] [address] [phone] 8/18/2016     [place] [address]       Subscriber Name Subscriber Birth Date Member ID       HAIM PARKER 1960 [id number]                 Emergency Contacts      (Rel.) Home Phone Work Phone Mobile Phone    Belen Parker (Other) [phone] -- --               History & Physical      H&P signed by Edgar Sanon MD at 1/19/2017  9:25 PM            ADMITTING DIAGNOSIS:  Abscess cellulitis of the left lower extremity.     HISTORY OF PRESENT ILLNESS: The patient is a 56-year-old that developed a boil over his left lower ankle joint area and picked at it with his knife and now has cellulitis above his ankle and into his foot. It is very tender to palpate and very warm. He denies any fever, chills. He was admitted and placed on Zosyn and vancomycin.     MEDICATIONS:  1.  Tramadol p.r.n.   2.  Spironolactone.    3.  Protonix.  "   4.  Glucotrol 5.    5.  Gabapentin.    6.  Alprazolam.     PAST MEDICAL HISTORY:  1.  Has history of alcoholic hepatitis and dependency.   2.  Hypertension.   3.  Major depression.    4.  He has also had bouts of hepatic encephalopathy.      PAST SURGICAL HISTORY:  Prior knee surgery without complications.      PHYSICAL EXAMINATION:  GENERAL:  He is alert, oriented, in no acute distress.   NECK: Supple.   LUNGS: Clear.   HEART: S1 and S2.    EXTREMITIES:  Left lower extremity marked erythema, warm, tender to palpation with a raised  partially drained abscess of his ankle.     PLAN: Continue the IV antibiotics, and obtain surgical consultation.        D: LUNA 01/19/2017 11:28:30 ET  T: tls / 01/19/2017 13:27:29 ET  Revision Count: 0  Job ID: [id number]  [id number]  cc:        Dictator Signature:___________________________     Edgar Sanon MD     Electronically signed by Edgar Sanon MD at 1/19/2017  9:25 PM           Physician Progress Notes (last 72 hours) (Notes from 1/18/2017  5:20 PM through 1/21/2017  5:20 PM)      Edgar Sanon MD at 1/19/2017  6:59 AM  Version 1 of 1         Haim Snell 56 y.o.   01/19/17    Subjective: Patient admitted with cellulitis of the left ankle with palpable [place].  Patient states he took his pocketknife to puncture at and now has marked erythema and warmth in the ankle  Objective: Vital signs stable afebrile.  Marked erythema and swelling of the left ankle with palpable of partially drained boil  Vital Signs (last 72 hrs)       01/15 0700  -  01/16 0659 01/16 0700  -  01/17 0659 01/17 0700  -  01/18 0659 01/18 0700  -  01/19 0659   Most Recent    Temp (°F)       97.5 -  98.2     98.2 (36.8)    Heart Rate       98 -  120     120    Resp       16 -  18     18    BP       132/72 -  155/99     136/78    SpO2 (%)       (!)81 -  98     95        Lab Results (last 72 hours)     Procedure Component Value Units Date/Time    CBC & Differential [[id number]] Collected:   01/18/17 1000    Specimen:  Blood Updated:  01/18/17 1010    Narrative:       The following orders were created for panel order CBC & Differential.  Procedure                               Abnormality         Status                     ---------                               -----------         ------                     CBC Auto Differential[[id number]]         Abnormal            Final result                 Please view results for these tests on the individual orders.    CBC Auto Differential [[id number]]  (Abnormal) Collected:  01/18/17 1000    Specimen:  Blood Updated:  01/18/17 1010     WBC 4.65 10*3/mm3      RBC 4.68 (L) 10*6/mm3      Hemoglobin 12.8 (L) g/dL      Hematocrit 41.7 (L) %      MCV 89.1 fL      MCH 27.4 pg      MCHC 30.7 (L) g/dL      RDW 18.0 (H) %      RDW-SD 56.6 (H) fl      MPV 9.8 fL      Platelets 193 10*3/mm3      Neutrophil % 54.2 %      Lymphocyte % 35.1 %      Monocyte % 9.0 %      Eosinophil % 1.5 %      Basophil % 0.2 %      Immature Grans % 0.0 %      Neutrophils, Absolute 2.52 10*3/mm3      Lymphocytes, Absolute 1.63 10*3/mm3      Monocytes, Absolute 0.42 10*3/mm3      Eosinophils, Absolute 0.07 10*3/mm3      Basophils, Absolute 0.01 10*3/mm3      Immature Grans, Absolute 0.00 10*3/mm3     Lactic Acid, Plasma [[id number]]  (Abnormal) Collected:  01/18/17 1000    Specimen:  Blood Updated:  01/18/17 1025     Lactate 3.0 (C) mmol/L     C-reactive Protein [[id number]]  (Normal) Collected:  01/18/17 1000    Specimen:  Blood Updated:  01/18/17 1026     C-Reactive Protein 0.91 mg/dL     Uric Acid [[id number]]  (Normal) Collected:  01/18/17 1000    Specimen:  Blood Updated:  01/18/17 1027     Uric Acid 4.0 mg/dL     Comprehensive Metabolic Panel [[id number]]  (Abnormal) Collected:  01/18/17 1000    Specimen:  Blood Updated:  01/18/17 1028     Glucose 353 (H) mg/dL      BUN 6 (L) mg/dL      Creatinine 0.71 mg/dL      Sodium 145 mmol/L      Potassium 3.5 mmol/L      Chloride 102 mmol/L      CO2 32.8 (H)  mmol/L      Calcium 8.7 mg/dL      Total Protein 8.2 (H) g/dL      Albumin 3.80 g/dL      ALT (SGPT) 24 U/L      AST (SGOT) 37 (H) U/L      Alkaline Phosphatase 248 (H) U/L      Total Bilirubin 0.3 mg/dL      eGFR Non African Amer 114 mL/min/1.73      Globulin 4.4 gm/dL      A/G Ratio 0.9 (L) g/dL      BUN/Creatinine Ratio 8.5      Anion Gap 10.2 mmol/L     Osmolality, Calculated [[id number]]  (Normal) Collected:  01/18/17 1000    Specimen:  Blood Updated:  01/18/17 1028     Osmolality Calc 300.5 mOsm/kg     Sedimentation Rate [[id number]]  (Abnormal) Collected:  01/18/17 1000    Specimen:  Blood Updated:  01/18/17 1028     Sed Rate 29 (H) mm/hr     Urinalysis With / Culture If Indicated [[id number]]  (Abnormal) Collected:  01/18/17 1141    Specimen:  Urine from Urine, Clean Catch Updated:  01/18/17 1147     Color, UA Yellow      Appearance, UA Clear      pH, UA 7.0      Specific Gravity, UA 1.016      Glucose, UA >=1000 mg/dL (3+) (A)      Ketones, UA Negative      Bilirubin, UA Negative      Blood, UA Negative      Protein, UA Negative      Leuk Esterase, UA Negative      Nitrite, UA Negative      Urobilinogen, UA 1.0 E.U./dL     Narrative:       Urine microscopic not indicated.    Light Blue Top [[id number]] Collected:  01/18/17 1000    Specimen:  Blood Updated:  01/18/17 1401     Extra Tube hold for add-on       Auto resulted       Lavender Top [[id number]] Collected:  01/18/17 1000    Specimen:  Blood Updated:  01/18/17 1401     Extra Tube hold for add-on       Auto resulted       [place] Draw [[id number]] Collected:  01/18/17 1000    Specimen:  Blood Updated:  01/18/17 1401    Narrative:       The following orders were created for panel order [place] Draw.  Procedure                               Abnormality         Status                     ---------                               -----------         ------                     Light Blue Top[[id number]]                                    Final result               Green  Top (Gel)[[id number]]                                   Final result               Lavender Top[[id number]]                                      Final result               Gold Top - SST[[id number]]                                    Final result                 Please view results for these tests on the individual orders.    Green Top (Gel) [[id number]] Collected:  01/18/17 1000    Specimen:  Blood Updated:  01/18/17 1401     Extra Tube Hold for add-ons.       Auto resulted.       Gold Top - SST [[id number]] Collected:  01/18/17 1000    Specimen:  Blood Updated:  01/18/17 1401     Extra Tube Hold for add-ons.       Auto resulted.       Lactate Acid, Reflex [[id number]]  (Abnormal) Collected:  01/18/17 1408    Specimen:  Blood Updated:  01/18/17 1510     Lactate 2.7 (C) mmol/L     POC Glucose Fingerstick [[id number]]  (Abnormal) Collected:  01/18/17 1654    Specimen:  Blood Updated:  01/18/17 1658     Glucose 206 (H) mg/dL     Narrative:       Meter: [id number] : [id number] mayne constanza    POC Glucose Fingerstick [[id number]]  (Abnormal) Collected:  01/18/17 2006    Specimen:  Blood Updated:  01/18/17 2010     Glucose 296 (H) mg/dL     Narrative:       Meter: [id number] : [id number] castelan constanza    Blood Culture [[id number]]  (Normal) Collected:  01/18/17 1000    Specimen:  Blood from Arm, Right Updated:  01/18/17 2301     Blood Culture No growth at less than 24 hours     Blood Culture [[id number]]  (Normal) Collected:  01/18/17 1038    Specimen:  Blood from Arm, Left Updated:  01/18/17 2301     Blood Culture No growth at less than 24 hours         Imaging Results (last 24 hours)     Procedure Component Value Units Date/Time    XR Foot 3+ View Left [[id number]] Collected:  01/18/17 1057     Updated:  01/18/17 1100    Narrative:       XR FOOT 3+ VW LEFT-     REASON FOR EXAM:  pain/swelling     The bony structures are intact. There were no acute fractures,  dislocations or bony destructive changes seen. No periosteal reaction is  noted.  There were no radiopaque foreign bodies identified in the soft  tissues.       Impression:       Negative foot     This report was finalized on 1/18/2017 10:58 AM by Dr. Erik Dunn II, MD.       XR Ankle 3+ View Left [[id number]] Collected:  01/18/17 1105     Updated:  01/18/17 1108    Narrative:       XR ANKLE 3+ VIEW LEFT-     REASON FOR EXAM:  Pain/swelling.     FINDINGS: The bony structures of the ankle show no evidence of fracture  or dislocation. There is evidence of diffuse soft tissue swelling. There  were no foreign bodies in the soft tissues.       Impression:       Soft tissue swelling around the left ankle.     This report was finalized on 1/18/2017 11:06 AM by Dr. Erik Dunn II, MD.           Assessment:Active Problems:    Cellulitis of left lower extremity     Plan: Consult surgery continue IV antibiotics     Electronically signed by Edgar Sanon MD at 1/19/2017  7:00 AM      Edgar Sanon MD at 1/20/2017  6:53 AM  Version 1 of 1         Haim Snell 56 y.o.   01/20/17    Subjective: Patient states is a left foreleg feels some better  Objective: Some erythema and warmth overside dorsal foot but looks like the erythema on his left lower extremity has has decreased  Vital Signs (last 72 hrs)       01/16 0700  -  01/17 0659 01/17 0700  -  01/18 0659 01/18 0700  -  01/19 0659 01/19 0700  -  01/20 0653   Most Recent    Temp (°F)     97.5 -  98.2    97.8 -  98.6     98.2 (36.8)    Heart Rate     98 -  120    79 -  107     96    Resp     16 - 18 16 -  20     18    BP     132/72 -  155/99    119/72 -  167/96     128/74    SpO2 (%)     (!)81 -  98    93 -  98     94        Lab Results (last 72 hours)     Procedure Component Value Units Date/Time    CBC & Differential [[id number]] Collected:  01/18/17 1000    Specimen:  Blood Updated:  01/18/17 1010    Narrative:       The following orders were created for panel order CBC & Differential.  Procedure                                Abnormality         Status                     ---------                               -----------         ------                     CBC Auto Differential[[id number]]         Abnormal            Final result                 Please view results for these tests on the individual orders.    CBC Auto Differential [[id number]]  (Abnormal) Collected:  01/18/17 1000    Specimen:  Blood Updated:  01/18/17 1010     WBC 4.65 10*3/mm3      RBC 4.68 (L) 10*6/mm3      Hemoglobin 12.8 (L) g/dL      Hematocrit 41.7 (L) %      MCV 89.1 fL      MCH 27.4 pg      MCHC 30.7 (L) g/dL      RDW 18.0 (H) %      RDW-SD 56.6 (H) fl      MPV 9.8 fL      Platelets 193 10*3/mm3      Neutrophil % 54.2 %      Lymphocyte % 35.1 %      Monocyte % 9.0 %      Eosinophil % 1.5 %      Basophil % 0.2 %      Immature Grans % 0.0 %      Neutrophils, Absolute 2.52 10*3/mm3      Lymphocytes, Absolute 1.63 10*3/mm3      Monocytes, Absolute 0.42 10*3/mm3      Eosinophils, Absolute 0.07 10*3/mm3      Basophils, Absolute 0.01 10*3/mm3      Immature Grans, Absolute 0.00 10*3/mm3     Lactic Acid, Plasma [[id number]]  (Abnormal) Collected:  01/18/17 1000    Specimen:  Blood Updated:  01/18/17 1025     Lactate 3.0 (C) mmol/L     C-reactive Protein [[id number]]  (Normal) Collected:  01/18/17 1000    Specimen:  Blood Updated:  01/18/17 1026     C-Reactive Protein 0.91 mg/dL     Uric Acid [[id number]]  (Normal) Collected:  01/18/17 1000    Specimen:  Blood Updated:  01/18/17 1027     Uric Acid 4.0 mg/dL     Comprehensive Metabolic Panel [[id number]]  (Abnormal) Collected:  01/18/17 1000    Specimen:  Blood Updated:  01/18/17 1028     Glucose 353 (H) mg/dL      BUN 6 (L) mg/dL      Creatinine 0.71 mg/dL      Sodium 145 mmol/L      Potassium 3.5 mmol/L      Chloride 102 mmol/L      CO2 32.8 (H) mmol/L      Calcium 8.7 mg/dL      Total Protein 8.2 (H) g/dL      Albumin 3.80 g/dL      ALT (SGPT) 24 U/L      AST (SGOT) 37 (H) U/L      Alkaline Phosphatase 248 (H) U/L      Total  Bilirubin 0.3 mg/dL      eGFR Non African Amer 114 mL/min/1.73      Globulin 4.4 gm/dL      A/G Ratio 0.9 (L) g/dL      BUN/Creatinine Ratio 8.5      Anion Gap 10.2 mmol/L     Osmolality, Calculated [[id number]]  (Normal) Collected:  01/18/17 1000    Specimen:  Blood Updated:  01/18/17 1028     Osmolality Calc 300.5 mOsm/kg     Sedimentation Rate [[id number]]  (Abnormal) Collected:  01/18/17 1000    Specimen:  Blood Updated:  01/18/17 1028     Sed Rate 29 (H) mm/hr     Urinalysis With / Culture If Indicated [[id number]]  (Abnormal) Collected:  01/18/17 1141    Specimen:  Urine from Urine, Clean Catch Updated:  01/18/17 1147     Color, UA Yellow      Appearance, UA Clear      pH, UA 7.0      Specific Gravity, UA 1.016      Glucose, UA >=1000 mg/dL (3+) (A)      Ketones, UA Negative      Bilirubin, UA Negative      Blood, UA Negative      Protein, UA Negative      Leuk Esterase, UA Negative      Nitrite, UA Negative      Urobilinogen, UA 1.0 E.U./dL     Narrative:       Urine microscopic not indicated.    Light Blue Top [[id number]] Collected:  01/18/17 1000    Specimen:  Blood Updated:  01/18/17 1401     Extra Tube hold for add-on       Auto resulted       Lavender Top [[id number]] Collected:  01/18/17 1000    Specimen:  Blood Updated:  01/18/17 1401     Extra Tube hold for add-on       Auto resulted       [place] Draw [[id number]] Collected:  01/18/17 1000    Specimen:  Blood Updated:  01/18/17 1401    Narrative:       The following orders were created for panel order [place] Draw.  Procedure                               Abnormality         Status                     ---------                               -----------         ------                     Light Blue Top[[id number]]                                    Final result               Green Top (Gel)[[id number]]                                   Final result               Lavender Top[[id number]]                                      Final result               Gold Top -  SST[[id number]]                                    Final result                 Please view results for these tests on the individual orders.    Green Top (Gel) [[id number]] Collected:  01/18/17 1000    Specimen:  Blood Updated:  01/18/17 1401     Extra Tube Hold for add-ons.       Auto resulted.       Gold Top - SST [[id number]] Collected:  01/18/17 1000    Specimen:  Blood Updated:  01/18/17 1401     Extra Tube Hold for add-ons.       Auto resulted.       Lactate Acid, Reflex [[id number]]  (Abnormal) Collected:  01/18/17 1408    Specimen:  Blood Updated:  01/18/17 1510     Lactate 2.7 (C) mmol/L     POC Glucose Fingerstick [[id number]]  (Abnormal) Collected:  01/18/17 1654    Specimen:  Blood Updated:  01/18/17 1658     Glucose 206 (H) mg/dL     Narrative:       Meter: [id number] : [id number] mayne constanza    POC Glucose Fingerstick [[id number]]  (Abnormal) Collected:  01/18/17 2006    Specimen:  Blood Updated:  01/18/17 2010     Glucose 296 (H) mg/dL     Narrative:       Meter: [id number] : [id number] flip apodaca    Blood Culture [[id number]]  (Normal) Collected:  01/18/17 1000    Specimen:  Blood from Arm, Right Updated:  01/19/17 1101     Blood Culture No growth at 24 hours     Blood Culture [[id number]]  (Normal) Collected:  01/18/17 1038    Specimen:  Blood from Arm, Left Updated:  01/19/17 1101     Blood Culture No growth at 24 hours     POC Glucose Fingerstick [[id number]]  (Normal) Collected:  01/19/17 1126    Specimen:  Blood Updated:  01/19/17 1138     Glucose 122 mg/dL     Narrative:       Meter: [id number] : [id number] patrh adams    POC Glucose Fingerstick [[id number]]  (Abnormal) Collected:  01/19/17 1308    Specimen:  Blood Updated:  01/19/17 1312     Glucose 134 (H) mg/dL     Narrative:       Meter: [id number] : [id number] Alyssia MORALES    POC Glucose Fingerstick [[id number]]  (Abnormal) Collected:  01/19/17 1714    Specimen:  Blood Updated:  01/19/17 1726     Glucose 250 (H) mg/dL     Narrative:        Meter: [id number] : [id number] parth adams    POC Glucose Fingerstick [[id number]]  (Abnormal) Collected:  01/19/17 2052    Specimen:  Blood Updated:  01/19/17 2055     Glucose 219 (H) mg/dL     Narrative:       Meter: [id number] : [id number] flip apodaca        Imaging Results (last 24 hours)     ** No results found for the last 24 hours. **        Assessment:Active Problems:    Cellulitis of left lower extremity   Cirrhosis  Plan: Continue IV antibiotics and wound care     Electronically signed by Edgar Sanon MD at 1/20/2017  6:54 AM      Manpreet Garcia MD at 1/20/2017 10:37 AM  Version 1 of 1         No acute events overnight.  No subcutaneous abscess discovered on incision and drainage at the site of ulceration.    Afebrile, vital signs stable  Left lower extremity edema and cellulitis improving.  No purulent drainage.    56-year-old male with left lower extremity cellulitis.  At the site of skin breakdown there is no abscess found upon incision and drainage.  The wound will be packed with twice daily wet-to-dry gauze and the patient will follow-up in 2 weeks.  Continue antibiotics.     Electronically signed by Manpreet Garcia MD at 1/20/2017 10:38 AM      Samuel Duane Kreis, MD at 1/21/2017  8:13 AM  Version 1 of 1              LOS: 2 days     Chief Complaint:  Left lower extremity cellulitis    Subjective     Interval History:   blood sugars are usually greater than 200 and sometimes up to as high as 280.  He continues to have a lot of pain in his left leg.  His foot is continued to be erythematous.  He states it's hurting continuously from the mid aspect of his leg down into the foot.  Has not really changed significantly.  He doesn't think it looks a lot better.  He is having no fevers.  He had and D of the wound but there was no abscess, just inflamed tissue.  He continues on vancomycin and he continues on Zosyn.  At home he takes glipizide 5 mg twice a day and states his sugars  "are usually pretty good.      Objective     Vital Signs  Visit Vitals   • /69 (BP Location: Left arm, Patient Position: Lying)   • Pulse 76   • Temp 97.6 °F (36.4 °C) (Oral)   • Resp 18   • Ht 70\" (177.8 cm)   • Wt 190 lb (86.2 kg)   • SpO2 94%   • BMI 27.26 kg/m2     Intake & Output (last day)       01/20 0701 - 01/21 0700 01/21 0701 - 01/22 0700    P.O. 1440     I.V. (mL/kg) 500 (5.8)     IV Piggyback 200     Total Intake(mL/kg) 2140 (24.8)     Urine (mL/kg/hr) 2500 (1.2)     Stool 0 (0)     Total Output 2500      Net -360            Unmeasured Urine Occurrence 1 x     Unmeasured Stool Occurrence 0 x             Physical Exam:     General Appearance:    Alert, cooperative, in no acute distress   Head:    Normocephalic, without obvious abnormality, atraumatic   Eyes:            Lids and lashes normal, conjunctivae and sclerae normal, no   icterus, no pallor, corneas clear, PERRLA   Ears:    Ears appear intact with no abnormalities noted   Throat:   No oral lesions, no thrush, oral mucosa moist   Neck:   No adenopathy, supple, trachea midline, no thyromegaly, no   carotid bruit, no JVD   Lungs:     Clear to auscultation,respirations regular, even and                  unlabored    Heart:    Regular rhythm and normal rate, normal S1 and S2, no            murmur, no gallop, no rub, no click   Chest Wall:    No abnormalities observed   Abdomen:     Normal bowel sounds, no masses, no organomegaly, soft        non-tender, non-distended, no guarding, no rebound                tenderness   Extremities:   Moves all extremities well, no edema, no cyanosis, no             Redness. LLE with erythema @ foot and up to ankle.  Warm to touch.  Tender just above the ankle @ site of I&D   Pulses:   Pulses palpable and equal bilaterally   Skin:   No bleeding, bruising or rash   Lymph nodes:   No palpable adenopathy   Neurologic:   Cranial nerves 2 - 12 grossly intact, sensation intact, DTR       present and equal bilaterally "        Results Review:    Lab Results   Component Value Date    WBC 4.65 01/18/2017    HGB 12.8 (L) 01/18/2017    HCT 41.7 (L) 01/18/2017    MCV 89.1 01/18/2017     01/18/2017       Lab Results   Component Value Date    GLUCOSE 353 (H) 01/18/2017    BUN 6 (L) 01/18/2017    CREATININE 0.71 01/18/2017    EGFRIFNONA 114 01/18/2017    EGFRIFAFRI  09/23/2016      Comment:      <15 Indicative of kidney failure.    BCR 8.5 01/18/2017     01/18/2017    K 3.5 01/18/2017     01/18/2017    CO2 32.8 (H) 01/18/2017    CALCIUM 8.7 01/18/2017    ALBUMIN 3.80 01/18/2017    LABIL2 0.9 (L) 01/18/2017    AST 37 (H) 01/18/2017    ALT 24 01/18/2017     Lab Results   Component Value Date    INR 1.17 (H) 02/16/2016    INR 1.24 (H) 02/11/2016    INR 1.23 (H) 02/09/2016       GLUCOSE   Date Value Ref Range Status   01/21/2017 166 (H) 70 - 130 mg/dL Final   01/20/2017 280 (H) 70 - 130 mg/dL Final   01/20/2017 235 (H) 70 - 130 mg/dL Final   01/20/2017 245 (H) 70 - 130 mg/dL Final   01/20/2017 142 (H) 70 - 130 mg/dL Final          Medication Review:     Current Facility-Administered Medications:   •  ALPRAZolam (XANAX) tablet 1 mg, 1 mg, Oral, BID PRN, Reza Arias MD  •  citalopram (CeleXA) tablet 20 mg, 20 mg, Oral, Daily, Reza Arias MD, 20 mg at 01/21/17 0807  •  dextrose (D50W) solution 25 g, 25 g, Intravenous, Q15 Min PRN, Edgar Sanon MD  •  dextrose (GLUTOSE) oral gel 15 g, 15 g, Oral, Q15 Min PRN, Edgar Sanon MD  •  DULoxetine (CYMBALTA) DR capsule 60 mg, 60 mg, Oral, Daily, Reza Arias MD, 60 mg at 01/21/17 0807  •  gabapentin (NEURONTIN) capsule 800 mg, 800 mg, Oral, Q6H, Reza Arias MD, 800 mg at 01/21/17 0524  •  glipiZIDE (GLUCOTROL) tablet 5 mg, 5 mg, Oral, BID AC, Reza Arias MD, 5 mg at 01/21/17 0807  •  glucagon (GLUCAGEN) injection 1 mg, 1 mg, Subcutaneous, Q15 Min PRN, Edgar Sanon MD  •  insulin aspart (novoLOG) injection 0-7 Units, 0-7 Units, Subcutaneous, 4x Daily AC & at  Bedtime, Clara Posadas MD, 2 Units at 01/21/17 0810  •  Morphine sulfate (PF) injection 2 mg, 2 mg, Intravenous, Q3H PRN, Radu Arias MD, 2 mg at 01/21/17 0807  •  pantoprazole (PROTONIX) EC tablet 40 mg, 40 mg, Oral, BID AC, Reza Arias MD, 40 mg at 01/21/17 0807  •  piperacillin-tazobactam (ZOSYN) 3.375 g/100 mL 0.9% NS IVPB (mbp), 3.375 g, Intravenous, Q6H, Radu Arias MD, 3.375 g at 01/21/17 0524  •  potassium chloride (K-DUR,KLOR-CON) ER tablet 10 mEq, 10 mEq, Oral, Daily, Reza Arias MD, 10 mEq at 01/21/17 0807  •  spironolactone (ALDACTONE) tablet 25 mg, 25 mg, Oral, Daily, Reza Arias MD, 25 mg at 01/21/17 0807  •  vancomycin (VANCOCIN) 1,750 mg in sodium chloride 0.9 % 500 mL IVPB, 1,750 mg, Intravenous, Q12H, Clara Posadas MD, 1,750 mg at 01/20/17 2119      Assessment&#47;Plan     LLE cellulitis  Tobacco abuse  Type II Diabetes Mellitus with hyperglycemia    Increase sliding scale insulin coverage.  Continue glipizide.    Start Lovenox for DVT prophylaxis    Continue Zosyn and vancomycin.  His foot continues to be significantly erythematous.  Plan to keep them moist another 24 hours.  CBC and BMP tomorrow morning    He refuses to quit smoking    Samuel Duane Kreis, MD  01/21/17  8:13 AM       Electronically signed by Samuel Duane Kreis, MD at 1/21/2017  8:16 AM           Consult Notes (last 72 hours) (Notes from 1/18/2017  5:20 PM through 1/21/2017  5:20 PM)      Manpreet Trevino MD at 1/19/2017  9:44 AM  Version 1 of 1     Consult Orders:    1. Inpatient Consult to General Surgery [[id number]] ordered by Clara Posadas MD at 01/19/17 0705                Inpatient Consult to General Surgery  Consult performed by: MANPREET TREVINO  Consult ordered by: CLARA POSADAS  Reason for consult: left ankle abscess and cellulitis  Assessment/Recommendations: OR for incision and drainage          Patient Care Team:  Clara Posadas MD as PCP - General  Clara Posadas,  MD as PCP - Family Medicine    Chief complaint: Left foot swelling/cellulitis    Subjective     HPI Comments: Patient with diabetes.  No crepitance or signs of osteomyelitis on imaging.  Lactic acidosis present    Foot Problem   This is a new problem. The current episode started in the past 7 days. The problem occurs constantly. The problem has been gradually worsening. Pertinent negatives include no abdominal pain, chest pain, chills, coughing, fever, headaches, joint swelling, nausea, rash, sore throat, vomiting or weakness. Nothing aggravates the symptoms.       Review of Systems   Constitutional: Negative for activity change, appetite change, chills and fever.   HENT: Negative for sore throat and trouble swallowing.    Eyes: Negative for visual disturbance.   Respiratory: Negative for cough and shortness of breath.    Cardiovascular: Negative for chest pain and palpitations.   Gastrointestinal: Negative for abdominal distention, abdominal pain, blood in stool, constipation, diarrhea, nausea and vomiting.   Endocrine: Negative for cold intolerance and heat intolerance.   Genitourinary: Negative for dysuria.   Musculoskeletal: Negative for joint swelling.   Skin: Positive for wound. Negative for color change and rash.   Allergic/Immunologic: Negative for immunocompromised state.   Neurological: Negative for dizziness, seizures, weakness and headaches.   Hematological: Negative for adenopathy. Does not bruise/bleed easily.   Psychiatric/Behavioral: Negative for agitation and confusion.        Past Medical History   Diagnosis Date   • Alcohol abuse    • Anxiety    • Depression    • Diabetes    • GERD (gastroesophageal reflux disease)    • Hepatic encephalopathy    • Hypertension    • Liver disease    • Neuropathic pain    • Withdrawal symptoms, alcohol    ,   Past Surgical History   Procedure Laterality Date   • Knee surgery Right    ,   Family History   Problem Relation Age of Onset   • Family history unknown: Yes    ,   Social History   Substance Use Topics   • Smoking status: Current Every Day Smoker     Packs/day: 1.00     Years: 40.00     Types: Cigarettes   • Smokeless tobacco: Never Used      Comment: declines   • Alcohol use Yes      Comment: patient says he started drinking at age 11. drank heavy for several years starting about 45 years ago. has been drinking more which is now  up to a  5th a day this time for about 7 months.    ,   Prescriptions Prior to Admission   Medication Sig Dispense Refill Last Dose   • ALPRAZolam (XANAX) 1 MG tablet Take 1 mg by mouth 2 (Two) Times a Day As Needed for anxiety.   1/18/2017 at 0500   • citalopram (CeleXA) 20 MG tablet Take 20 mg by mouth daily.   1/18/2017 at 0500   • DULoxetine (CYMBALTA) 60 MG capsule Take 60 mg by mouth daily.   1/18/2017 at 0500   • gabapentin (NEURONTIN) 800 MG tablet Take 800 mg by mouth 4 (four) times a day.   1/18/2017 at 0500   • glipiZIDE (GLUCOTROL) 5 MG tablet Take 5 mg by mouth 2 (two) times a day before meals.   1/18/2017 at 0500   • pantoprazole (PROTONIX) 40 MG EC tablet Take 40 mg by mouth 2 (two) times a day.   1/18/2017 at 0500   • potassium chloride (MICRO-K) 10 MEQ CR capsule Take 10 mEq by mouth daily.   1/18/2017 at 0500   • spironolactone (ALDACTONE) 50 MG tablet Take 25 mg by mouth Daily. Take 1/2 tablet (25mg) daily   1/18/2017 at 0500   • traMADol (ULTRAM) 50 MG tablet Take 50 mg by mouth 3 (Three) Times a Day As Needed for moderate pain (4-6).   1/18/2017 at 0500    and Allergies:  Sulfa antibiotics    Objective      Vital Signs  Temp:  [97.5 °F (36.4 °C)-98.5 °F (36.9 °C)] 98.5 °F (36.9 °C)  Heart Rate:  [] 107  Resp:  [18] 18  BP: (132-155)/(72-99) 135/72    Physical Exam   Constitutional: He is oriented to person, place, and time. He appears well-developed.   HENT:   Head: Normocephalic and atraumatic.   Mouth/Throat: Mucous membranes are normal.   Eyes: Conjunctivae are normal. Pupils are equal, round, and reactive to  light.   Neck: Neck supple. No JVD present. No tracheal deviation present. No thyromegaly present.   Cardiovascular: Normal rate and regular rhythm.  Exam reveals no gallop and no friction rub.  murmur heard.  Pulmonary/Chest: Effort normal and breath sounds normal.   Abdominal: Soft. He exhibits no distension. There is no splenomegaly or hepatomegaly. There is no tenderness. No hernia.   Musculoskeletal: Normal range of motion. He exhibits no deformity.   Left lateral ankle with abscess and surrounding cellulitis and edema involving the foot up to the mid calf.  Palpable pedal pulses.   Neurological: He is alert and oriented to person, place, and time.   Skin: Skin is warm and dry.   Psychiatric: He has a normal mood and affect.       Results Review:    I reviewed the patient's new clinical results.        Assessment&#47;Plan     Active Problems:    Cellulitis of left lower extremity      Assessment:  (Diabetes  Cellulitis  Lactic acidosis  L ankle skin abscess).     Plan:   (Broad spectrum antibiotics  NPO  OR for incision and drainage.).       I discussed the patients findings and my recommendations with patient    Manpreet Garcia MD  01/19/17  9:44 AM           Electronically signed by Manpreet Garcia MD at 1/19/2017  9:47 AM

## 2017-01-22 VITALS
HEIGHT: 70 IN | TEMPERATURE: 98 F | WEIGHT: 190 LBS | HEART RATE: 112 BPM | RESPIRATION RATE: 22 BRPM | OXYGEN SATURATION: 98 % | DIASTOLIC BLOOD PRESSURE: 83 MMHG | SYSTOLIC BLOOD PRESSURE: 115 MMHG | BODY MASS INDEX: 27.2 KG/M2

## 2017-01-22 LAB
ANION GAP SERPL CALCULATED.3IONS-SCNC: 10.7 MMOL/L (ref 3.6–11.2)
BASOPHILS # BLD AUTO: 0.01 10*3/MM3 (ref 0–0.3)
BASOPHILS NFR BLD AUTO: 0.2 % (ref 0–2)
BUN BLD-MCNC: <5 MG/DL (ref 7–21)
BUN/CREAT SERPL: ABNORMAL (ref 7–25)
CALCIUM SPEC-SCNC: 7.9 MG/DL (ref 7.7–10)
CHLORIDE SERPL-SCNC: 108 MMOL/L (ref 99–112)
CO2 SERPL-SCNC: 25.3 MMOL/L (ref 24.3–31.9)
CREAT BLD-MCNC: 0.78 MG/DL (ref 0.43–1.29)
DEPRECATED RDW RBC AUTO: 55.2 FL (ref 37–54)
EOSINOPHIL # BLD AUTO: 0.09 10*3/MM3 (ref 0–0.7)
EOSINOPHIL NFR BLD AUTO: 2 % (ref 0–5)
ERYTHROCYTE [DISTWIDTH] IN BLOOD BY AUTOMATED COUNT: 17.3 % (ref 11.5–14.5)
GFR SERPL CREATININE-BSD FRML MDRD: 103 ML/MIN/1.73
GLUCOSE BLD-MCNC: 256 MG/DL (ref 70–110)
GLUCOSE BLDC GLUCOMTR-MCNC: 262 MG/DL (ref 70–130)
HCT VFR BLD AUTO: 39 % (ref 42–52)
HGB BLD-MCNC: 11.7 G/DL (ref 14–18)
IMM GRANULOCYTES # BLD: 0.02 10*3/MM3 (ref 0–0.03)
IMM GRANULOCYTES NFR BLD: 0.4 % (ref 0–0.5)
LYMPHOCYTES # BLD AUTO: 1.79 10*3/MM3 (ref 1–3)
LYMPHOCYTES NFR BLD AUTO: 39.3 % (ref 21–51)
MCH RBC QN AUTO: 26.7 PG (ref 27–33)
MCHC RBC AUTO-ENTMCNC: 30 G/DL (ref 33–37)
MCV RBC AUTO: 89 FL (ref 80–94)
MONOCYTES # BLD AUTO: 0.38 10*3/MM3 (ref 0.1–0.9)
MONOCYTES NFR BLD AUTO: 8.4 % (ref 0–10)
NEUTROPHILS # BLD AUTO: 2.26 10*3/MM3 (ref 1.4–6.5)
NEUTROPHILS NFR BLD AUTO: 49.7 % (ref 30–70)
OSMOLALITY SERPL CALC.SUM OF ELEC: NORMAL MOSM/KG (ref 273–305)
PLATELET # BLD AUTO: 214 10*3/MM3 (ref 130–400)
PMV BLD AUTO: 9.9 FL (ref 6–10)
POTASSIUM BLD-SCNC: 3.7 MMOL/L (ref 3.5–5.3)
RBC # BLD AUTO: 4.38 10*6/MM3 (ref 4.7–6.1)
SODIUM BLD-SCNC: 144 MMOL/L (ref 135–153)
VANCOMYCIN TROUGH SERPL-MCNC: 17 MCG/ML (ref 5–15)
WBC NRBC COR # BLD: 4.55 10*3/MM3 (ref 4.5–12.5)

## 2017-01-22 PROCEDURE — 25010000002 VANCOMYCIN PER 500 MG: Performed by: FAMILY MEDICINE

## 2017-01-22 PROCEDURE — 25010000002 MORPHINE SULFATE (PF) 2 MG/ML SOLUTION: Performed by: FAMILY MEDICINE

## 2017-01-22 PROCEDURE — 25010000002 ENOXAPARIN PER 10 MG: Performed by: FAMILY MEDICINE

## 2017-01-22 PROCEDURE — 80048 BASIC METABOLIC PNL TOTAL CA: CPT | Performed by: FAMILY MEDICINE

## 2017-01-22 PROCEDURE — 25010000002 PIPERACILLIN-TAZOBACTAM: Performed by: FAMILY MEDICINE

## 2017-01-22 PROCEDURE — 85025 COMPLETE CBC W/AUTO DIFF WBC: CPT | Performed by: FAMILY MEDICINE

## 2017-01-22 PROCEDURE — 80202 ASSAY OF VANCOMYCIN: CPT

## 2017-01-22 PROCEDURE — 63710000001 INSULIN ASPART PER 5 UNITS: Performed by: FAMILY MEDICINE

## 2017-01-22 PROCEDURE — 82962 GLUCOSE BLOOD TEST: CPT

## 2017-01-22 RX ORDER — DOXYCYCLINE HYCLATE 100 MG/1
100 TABLET, DELAYED RELEASE ORAL 2 TIMES DAILY
Qty: 20 TABLET | Refills: 0 | Status: SHIPPED | OUTPATIENT
Start: 2017-01-22

## 2017-01-22 RX ORDER — AMOXICILLIN AND CLAVULANATE POTASSIUM 875; 125 MG/1; MG/1
1 TABLET, FILM COATED ORAL 2 TIMES DAILY
Qty: 20 TABLET | Refills: 0 | Status: SHIPPED | OUTPATIENT
Start: 2017-01-22

## 2017-01-22 RX ORDER — MAGNESIUM HYDROXIDE 1200 MG/15ML
LIQUID ORAL
Status: DISCONTINUED
Start: 2017-01-22 | End: 2017-01-22 | Stop reason: HOSPADM

## 2017-01-22 RX ADMIN — POTASSIUM CHLORIDE 10 MEQ: 750 TABLET, FILM COATED, EXTENDED RELEASE ORAL at 08:23

## 2017-01-22 RX ADMIN — PIPERACILLIN SODIUM,TAZOBACTAM SODIUM 3.38 G: 3; .375 INJECTION, POWDER, FOR SOLUTION INTRAVENOUS at 06:55

## 2017-01-22 RX ADMIN — PANTOPRAZOLE SODIUM 40 MG: 40 TABLET, DELAYED RELEASE ORAL at 08:23

## 2017-01-22 RX ADMIN — ENOXAPARIN SODIUM 40 MG: 40 INJECTION SUBCUTANEOUS at 08:23

## 2017-01-22 RX ADMIN — DULOXETINE HYDROCHLORIDE 60 MG: 60 CAPSULE, DELAYED RELEASE ORAL at 08:23

## 2017-01-22 RX ADMIN — VANCOMYCIN HYDROCHLORIDE 1750 MG: 5 INJECTION, POWDER, LYOPHILIZED, FOR SOLUTION INTRAVENOUS at 09:21

## 2017-01-22 RX ADMIN — GABAPENTIN 800 MG: 400 CAPSULE ORAL at 06:55

## 2017-01-22 RX ADMIN — MORPHINE SULFATE 2 MG: 2 INJECTION, SOLUTION INTRAMUSCULAR; INTRAVENOUS at 06:55

## 2017-01-22 RX ADMIN — GLIPIZIDE 5 MG: 5 TABLET ORAL at 08:23

## 2017-01-22 RX ADMIN — MORPHINE SULFATE 2 MG: 2 INJECTION, SOLUTION INTRAMUSCULAR; INTRAVENOUS at 01:58

## 2017-01-22 RX ADMIN — CITALOPRAM HYDROBROMIDE 20 MG: 20 TABLET ORAL at 08:23

## 2017-01-22 RX ADMIN — GABAPENTIN 800 MG: 400 CAPSULE ORAL at 00:53

## 2017-01-22 RX ADMIN — MORPHINE SULFATE 2 MG: 2 INJECTION, SOLUTION INTRAMUSCULAR; INTRAVENOUS at 10:13

## 2017-01-22 RX ADMIN — INSULIN ASPART 8 UNITS: 100 INJECTION, SOLUTION INTRAVENOUS; SUBCUTANEOUS at 08:23

## 2017-01-22 RX ADMIN — PIPERACILLIN SODIUM,TAZOBACTAM SODIUM 3.38 G: 3; .375 INJECTION, POWDER, FOR SOLUTION INTRAVENOUS at 00:53

## 2017-01-22 NOTE — CONSULTS
Vancomycin trough level obtained this AM was reported at 17 mg/L.  This is within the desired therapeutic trough range.  Recommend continuing current dose of 1750 mg iv q12hrs.  Pharmacy will continue to follow.  Thank you.

## 2017-01-22 NOTE — DISCHARGE INSTR - APPOINTMENTS
Follow-up with Dr. Sanon in one week. Office Number [phone]  Follow-up with Dr. Garcia in two weeks. Office Number [phone]

## 2017-01-22 NOTE — PLAN OF CARE
Problem: Patient Care Overview (Adult)  Goal: Plan of Care Review  Outcome: Ongoing (interventions implemented as appropriate)    01/22/17 0443   Coping/Psychosocial Response Interventions   Plan Of Care Reviewed With patient   Patient Care Overview   Progress progress toward functional goals as expected       Goal: Adult Individualization and Mutuality  Outcome: Ongoing (interventions implemented as appropriate)    Problem: Fall Risk (Adult)  Goal: Identify Related Risk Factors and Signs and Symptoms  Outcome: Ongoing (interventions implemented as appropriate)    01/22/17 0443   Fall Risk   Fall Risk: Related Risk Factors gait/mobility problems;environment unfamiliar       Goal: Absence of Falls  Outcome: Ongoing (interventions implemented as appropriate)    01/22/17 0443   Fall Risk (Adult)   Absence of Falls making progress toward outcome         Problem: Infection, Risk/Actual (Adult)  Goal: Identify Related Risk Factors and Signs and Symptoms  Outcome: Ongoing (interventions implemented as appropriate)    01/22/17 0443   Infection, Risk/Actual   Infection, Risk/Actual: Related Risk Factors exposure to microbes   Signs and Symptoms (Infection, Risk/Actual) edema;pain       Goal: Infection Prevention/Resolution  Outcome: Ongoing (interventions implemented as appropriate)    01/22/17 0443   Infection, Risk/Actual (Adult)   Infection Prevention/Resolution making progress toward outcome         Problem: Cellulitis (Adult)  Goal: Signs and Symptoms of Listed Potential Problems Will be Absent or Manageable (Cellulitis)  Outcome: Ongoing (interventions implemented as appropriate)    01/22/17 0443   Cellulitis   Problems Assessed (Cellulitis) pain;infection   Problems Present (Cellulitis) pain         Problem: Diabetes, Type 2 (Adult)  Goal: Signs and Symptoms of Listed Potential Problems Will be Absent or Manageable (Diabetes, Type 2)  Outcome: Ongoing (interventions implemented as appropriate)    01/22/17 0443    Diabetes, Type 2   Problems Assessed (Type 2 Diabetes) all   Problems Present (Type 2 Diabetes) impaired glycemic control

## 2017-01-22 NOTE — DISCHARGE SUMMARY
Date of Admission: 1/18/2017  Date of Discharge:  1/22/2017    Discharge Diagnosis:   Left lower extremity cellulitis  Type 2 diabetes with hyperglycemia  Tobacco abuse      Hospital Course  This patient presented with left lower extremity cellulitis.  Surgery took him for IND but no abscess was noted.  He was placed on broad-spectrum antibiotics with vancomycin and Zosyn.  Had significant overall improvement in the appearance of his leg.  He is walking without any problems.  He's having twice a day wound care with gauze packing.  States he's doing well with that.  His left foot feels a lot better today than yesterday.  Less erythematous.  He's been walking around.  He states she's feeling a lot better.  He feels like he could go home.  He is tolerating vancomycin and Zosyn.  Blood glucose running greater than 200.  White blood count 4.5.  He had incision and debridement was no abscess noted.  No cultures could be obtained.  Blood culture from admission reveals no growth ×4 days.    Procedures Performed  Procedure(s):  INCISION AND DRAINAGE ABSCESS LEFT ANKLE       Consults:   Consults     Date and Time Order Name Status Description    1/19/2017 0705 Inpatient Consult to General Surgery Completed     1/18/2017 1238 Family Practice (ROEM Arias/CATALINA Arias/Fab)            Pertinent Test Results:   Lab Results   Component Value Date    WBC 4.55 01/22/2017    HGB 11.7 (L) 01/22/2017    HCT 39.0 (L) 01/22/2017    MCV 89.0 01/22/2017     01/22/2017     Lab Results   Component Value Date    GLUCOSE 256 (H) 01/22/2017    CALCIUM 7.9 01/22/2017     01/22/2017    K 3.7 01/22/2017    CO2 25.3 01/22/2017     01/22/2017    BUN <5 (L) 01/22/2017    CREATININE 0.78 01/22/2017    EGFRIFAFRI  09/23/2016      Comment:      <15 Indicative of kidney failure.    EGFRIFNONA 103 01/22/2017    BCR  01/22/2017      Comment:      Unable to calculate Bun/Crea Ratio.    ANIONGAP 10.7 01/22/2017     Lab Results   Component Value  Date    ALT 24 01/18/2017    AST 37 (H) 01/18/2017                               Physical Exam:     General Appearance:    Alert, cooperative, in no acute distress   Head:    Normocephalic, without obvious abnormality, atraumatic   Eyes:            Lids and lashes normal, conjunctivae and sclerae normal, no   icterus, no pallor, corneas clear, PERRLA   Ears:    Ears appear intact with no abnormalities noted   Throat:   No oral lesions, no thrush, oral mucosa moist   Neck:   No adenopathy, supple, trachea midline, no thyromegaly, no   carotid bruit, no JVD   Back:     No kyphosis present, no scoliosis present, no skin lesions,      erythema or scars, no tenderness to percussion or                   palpation,   range of motion normal   Lungs:     Clear to auscultation,respirations regular, even and                  unlabored    Heart:    Regular rhythm and normal rate, normal S1 and S2, no            murmur, no gallop, no rub, no click   Chest Wall:    No abnormalities observed   Abdomen:     Normal bowel sounds, no masses, no organomegaly, soft        non-tender, non-distended, no guarding, no rebound                tenderness   Rectal:   Deferred   Extremities:   Moves all extremities well, no edema, no cyanosis, no             redness   Pulses:   Pulses palpable and equal bilaterally   Skin:   Left foot with no erythema.  Wound wrapped / packed on the left distal leg.  Pedal pulses intact   Lymph nodes:   No palpable adenopathy   Neurologic:   Cranial nerves 2 - 12 grossly intact, sensation intact, DTR       present and equal bilaterally       Discharge Disposition  Home or Self Care    Discharge Medications   Haim Snell   Home Medication Instructions MONO:[id number]    Printed on:01/22/17 1150   Medication Information                      ALPRAZolam (XANAX) 1 MG tablet  Take 1 mg by mouth 2 (Two) Times a Day As Needed for anxiety.             amoxicillin-clavulanate (AUGMENTIN) 875-125 MG per tablet  Take 1  tablet by mouth 2 (Two) Times a Day.             citalopram (CeleXA) 20 MG tablet  Take 20 mg by mouth daily.             doxycycline (DORYX) 100 MG enteric coated tablet  Take 1 tablet by mouth 2 (Two) Times a Day.             DULoxetine (CYMBALTA) 60 MG capsule  Take 60 mg by mouth daily.             gabapentin (NEURONTIN) 800 MG tablet  Take 800 mg by mouth 4 (four) times a day.             glipiZIDE (GLUCOTROL) 5 MG tablet  Take 5 mg by mouth 2 (two) times a day before meals.             pantoprazole (PROTONIX) 40 MG EC tablet  Take 40 mg by mouth 2 (two) times a day.             potassium chloride (MICRO-K) 10 MEQ CR capsule  Take 10 mEq by mouth daily.             spironolactone (ALDACTONE) 50 MG tablet  Take 25 mg by mouth Daily. Take 1/2 tablet (25mg) daily             traMADol (ULTRAM) 50 MG tablet  Take 50 mg by mouth 3 (Three) Times a Day As Needed for moderate pain (4-6).                   Discharge Diet:    Diet Orders (active)     Start     Ordered    01/20/17 4678  Diet Regular; Consistent Carbohydrate  Diet Effective Now      01/20/17 7724          Follow-up Appointments  House in 2 weeks  [facility] in 1 week    Wound care bid with packing of the wound      Test Results Pending at Discharge   Order Current Status    Blood Culture Preliminary result    Blood Culture Preliminary result           Samuel Duane Kreis, MD  01/22/17  11:54 AM

## 2017-01-23 LAB
BACTERIA SPEC AEROBE CULT: NORMAL
BACTERIA SPEC AEROBE CULT: NORMAL

## (undated) DEVICE — SPNG GZ WOVN 4X4IN 12PLY 10/BX STRL

## (undated) DEVICE — AMD ANTIMICROBIAL GAUZE SPONGES,12 PLY USP TYPE VII, 0.2% POLYHEXAMETHYLENE BIGUANIDE HCI (PHMB): Brand: CURITY

## (undated) DEVICE — GLV SURG SENSICARE W/ALOE PF LF 7.5 STRL

## (undated) DEVICE — PK BASIC 70

## (undated) DEVICE — GAUZE,PACKING STRIP,PLAIN,1/4"X5YD,STRL: Brand: CURAD

## (undated) DEVICE — ENCORE® LATEX MICRO SIZE 8, STERILE LATEX POWDER-FREE SURGICAL GLOVE: Brand: ENCORE

## (undated) DEVICE — BNDG ELAS CO-FLEX SLF ADHR 4IN5YD LF STRL

## (undated) DEVICE — TRY SKINPREP PVP SCRB W PAINT

## (undated) DEVICE — BANDAGE,GAUZE,BULKEE II,2.25"X3YD,STRL: Brand: MEDLINE

## (undated) DEVICE — DRSNG PAD ABD 8X10IN STRL

## (undated) DEVICE — DBD-DRAPE,LAP,CHOLE,W/TROUGHS,STERILE: Brand: MEDLINE